# Patient Record
Sex: FEMALE | Race: WHITE | Employment: FULL TIME | URBAN - METROPOLITAN AREA
[De-identification: names, ages, dates, MRNs, and addresses within clinical notes are randomized per-mention and may not be internally consistent; named-entity substitution may affect disease eponyms.]

---

## 2017-11-07 ENCOUNTER — HOSPITAL ENCOUNTER (OUTPATIENT)
Dept: MAMMOGRAPHY | Age: 57
Discharge: HOME | End: 2017-11-07
Attending: INTERNAL MEDICINE | Admitting: INTERNAL MEDICINE

## 2017-11-07 DIAGNOSIS — Z12.31 VISIT FOR SCREENING MAMMOGRAM: ICD-10-CM

## 2018-01-23 ENCOUNTER — OFFICE VISIT (OUTPATIENT)
Dept: INTERNAL MEDICINE CLINIC | Age: 58
End: 2018-01-23

## 2018-01-23 VITALS
SYSTOLIC BLOOD PRESSURE: 124 MMHG | DIASTOLIC BLOOD PRESSURE: 88 MMHG | HEART RATE: 78 BPM | HEIGHT: 65 IN | RESPIRATION RATE: 16 BRPM | WEIGHT: 141 LBS | BODY MASS INDEX: 23.49 KG/M2

## 2018-01-23 DIAGNOSIS — Z00.00 ANNUAL PHYSICAL EXAM: Primary | ICD-10-CM

## 2018-01-23 DIAGNOSIS — F17.210 NICOTINE DEPENDENCE, CIGARETTES, UNCOMPLICATED: ICD-10-CM

## 2018-01-23 DIAGNOSIS — Z23 NEED FOR DIPHTHERIA-TETANUS-PERTUSSIS (TDAP) VACCINE, ADULT/ADOLESCENT: ICD-10-CM

## 2018-01-23 PROCEDURE — 4004F PT TOBACCO SCREEN RCVD TLK: CPT | Performed by: INTERNAL MEDICINE

## 2018-01-23 PROCEDURE — 3017F COLORECTAL CA SCREEN DOC REV: CPT | Performed by: INTERNAL MEDICINE

## 2018-01-23 PROCEDURE — 3014F SCREEN MAMMO DOC REV: CPT | Performed by: INTERNAL MEDICINE

## 2018-01-23 PROCEDURE — G8420 CALC BMI NORM PARAMETERS: HCPCS | Performed by: INTERNAL MEDICINE

## 2018-01-23 PROCEDURE — 90715 TDAP VACCINE 7 YRS/> IM: CPT | Performed by: INTERNAL MEDICINE

## 2018-01-23 PROCEDURE — G8484 FLU IMMUNIZE NO ADMIN: HCPCS | Performed by: INTERNAL MEDICINE

## 2018-01-23 PROCEDURE — G8427 DOCREV CUR MEDS BY ELIG CLIN: HCPCS | Performed by: INTERNAL MEDICINE

## 2018-01-23 PROCEDURE — 90471 IMMUNIZATION ADMIN: CPT | Performed by: INTERNAL MEDICINE

## 2018-01-23 PROCEDURE — 99213 OFFICE O/P EST LOW 20 MIN: CPT | Performed by: INTERNAL MEDICINE

## 2018-01-23 PROCEDURE — 99396 PREV VISIT EST AGE 40-64: CPT | Performed by: INTERNAL MEDICINE

## 2018-01-23 PROCEDURE — G0296 VISIT TO DETERM LDCT ELIG: HCPCS | Performed by: INTERNAL MEDICINE

## 2018-01-23 RX ORDER — BUPROPION HYDROCHLORIDE 150 MG/1
150 TABLET, EXTENDED RELEASE ORAL 2 TIMES DAILY
Qty: 60 TABLET | Refills: 0 | Status: SHIPPED | OUTPATIENT
Start: 2018-01-23 | End: 2018-02-20 | Stop reason: SDUPTHER

## 2018-01-23 NOTE — PROGRESS NOTES
[facility]  History and Physical  Ashley Garcia M.D. Javier Aleman  YOB: 1960    Date of Service:  1/23/2018    Chief Complaint:   Javier Aleman is a 62 y.o. female who presents for   Chief Complaint   Patient presents with    Annual Exam     quit smoking       HPI: Here for Annual Physical and Follow up. She wants to quit smoking with wellbutrin. She's ready now to quit.     Lab Results   Component Value Date    LABMICR Yes 12/29/2014     Lab Results   Component Value Date     05/17/2016    K 4.0 05/17/2016     05/17/2016    CO2 23 05/17/2016    BUN 10 05/17/2016    CREATININE <0.5 (L) 05/17/2016    GLUCOSE 106 (H) 05/17/2016    CALCIUM 9.7 05/17/2016     Lab Results   Component Value Date    CHOL 208 05/17/2016    TRIG 90 05/17/2016    HDL 56 05/17/2016    LDLCALC 134 05/17/2016     Lab Results   Component Value Date    ALT 34 05/17/2016    AST 27 05/17/2016     No results found for: TSH, T4FREE  Lab Results   Component Value Date    WBC 8.9 05/17/2016    HGB 15.2 05/17/2016    HCT 45.7 05/17/2016    MCV 93.8 05/17/2016     05/17/2016     Lab Results   Component Value Date    INR 1.04 05/19/2014      No results found for: PSA   No results found for: LABURIC     Wt Readings from Last 3 Encounters:   01/23/18 141 lb (64 kg)   08/25/16 136 lb (61.7 kg)   07/20/16 137 lb (62.1 kg)     BP Readings from Last 3 Encounters:   01/23/18 124/88   08/25/16 120/80   07/20/16 118/80       Patient Active Problem List   Diagnosis    Anxiety    Primary insomnia       No Known Allergies  No outpatient prescriptions have been marked as taking for the 1/23/18 encounter (Office Visit) with Mile Rodriguez MD.       Past Medical History:   Diagnosis Date    Anxiety     Diverticulitis      Past Surgical History:   Procedure Laterality Date    COLONOSCOPY  4/2/2015    INGUINAL HERNIA REPAIR Right 1996     Family History   Problem Relation Age of Onset    Other plan of care for Batsheva Hernandez        1/23/2018           Preventive Measures Status       Recommendations for screening   Colon Cancer Screen   Colonoscopy Test is due April   Breast Cancer Screen  Mammogram Repeat yearly   Cervical Cancer Screen   PAP smear:  Test is due 2021   Osteoporosis Screen   DEXA scan  This test is not clinically indicated   Diabetes Screen  Glucose (mg/dL)   Date Value   05/17/2016 106 (H)    Test recommended and ordered   Cholesterol Screen  Lab Results   Component Value Date    CHOL 208 (H) 05/17/2016    TRIG 90 05/17/2016    HDL 56 05/17/2016    LDLCALC 134 (H) 05/17/2016    Test recommended and ordered   Aspirin for Cardiovascular Prevention   No Not indicated   Weight: Body mass index is 23.46 kg/m².   5' 5\" (1.651 m)141 lb (64 kg)    Your BMI is between 18.5 and 24.9, which indicates that you are at a healthy weight   Living Will: No   Full Code    Recommended Immunizations    Immunization History   Administered Date(s) Administered    Pneumococcal Polysaccharide ([id number]) 05/02/2016        Influenza vaccine:  recommended every fall  Tetanus vaccine:  tetanus and diptheria vaccine (Td) administered today- risks and benefits discussed         Other Recommendations ·   See a dentist every 6 months  · Try to get at least 30 minutes of exercise 3-5 days per week  · Always wear a seat belt when traveling in a car  · Always wear a helmet when riding a bicycle or motorcycle  · When exposed to the sun, use a sunscreen that protects against both UVA and UVB radiation with an SPF of 30 or greater- reapply every 2 to 3 hours or after sweating, drying off with a towel, or swimming  · You need 6488-8961 mg of calcium and 3519-1332 IU of vitamin D per day- it is possible to meet your calcium requirement with diet alone, but a vitamin D supplement is usually necessary                 Assessment/Plan:    Elsie Gamboa was seen today for annual exam.    Diagnoses and all orders for this abstinence from smoking is critical

## 2018-02-06 ENCOUNTER — HOSPITAL ENCOUNTER (OUTPATIENT)
Dept: OTHER | Age: 58
Discharge: HOME | End: 2018-02-06
Attending: INTERNAL MEDICINE | Admitting: INTERNAL MEDICINE

## 2018-02-06 DIAGNOSIS — Z00.00 ANNUAL PHYSICAL EXAM: ICD-10-CM

## 2018-02-06 LAB
A/G RATIO: 1.6 (ref 1.1–2.2)
ALBUMIN SERPL-MCNC: 4.5 G/DL (ref 3.4–5)
ALP BLD-CCNC: 99 U/L (ref 40–129)
ALT SERPL-CCNC: 36 U/L (ref 10–40)
ANION GAP SERPL CALCULATED.3IONS-SCNC: 14 MMOL/L (ref 3–16)
AST SERPL-CCNC: 23 U/L (ref 15–37)
BASOPHILS ABSOLUTE: 0.1 K/UL (ref 0–0.2)
BASOPHILS RELATIVE PERCENT: 0.8 %
BILIRUB SERPL-MCNC: 0.3 MG/DL (ref 0–1)
BUN BLDV-MCNC: 9 MG/DL (ref 7–20)
CALCIUM SERPL-MCNC: 9.8 MG/DL (ref 8.3–10.6)
CHLORIDE BLD-SCNC: 101 MMOL/L (ref 99–110)
CHOLESTEROL, TOTAL: 293 MG/DL (ref 0–199)
CO2: 24 MMOL/L (ref 21–32)
CREAT SERPL-MCNC: <0.5 MG/DL (ref 0.6–1.1)
EOSINOPHILS ABSOLUTE: 0.3 K/UL (ref 0–0.6)
EOSINOPHILS RELATIVE PERCENT: 3.7 %
GFR AFRICAN AMERICAN: >60
GFR NON-AFRICAN AMERICAN: >60
GLOBULIN: 2.8 G/DL
GLUCOSE BLD-MCNC: 114 MG/DL (ref 70–99)
HCT VFR BLD CALC: 42.7 % (ref 36–48)
HDLC SERPL-MCNC: 64 MG/DL (ref 40–60)
HEMOGLOBIN: 14.3 G/DL (ref 12–16)
LDL CHOLESTEROL CALCULATED: 203 MG/DL
LYMPHOCYTES ABSOLUTE: 4.2 K/UL (ref 1–5.1)
LYMPHOCYTES RELATIVE PERCENT: 47.8 %
MCH RBC QN AUTO: 31.1 PG (ref 26–34)
MCHC RBC AUTO-ENTMCNC: 33.6 G/DL (ref 31–36)
MCV RBC AUTO: 92.6 FL (ref 80–100)
MONOCYTES ABSOLUTE: 0.5 K/UL (ref 0–1.3)
MONOCYTES RELATIVE PERCENT: 5.8 %
NEUTROPHILS ABSOLUTE: 3.6 K/UL (ref 1.7–7.7)
NEUTROPHILS RELATIVE PERCENT: 41.9 %
PDW BLD-RTO: 14.3 % (ref 12.4–15.4)
PLATELET # BLD: 268 K/UL (ref 135–450)
PMV BLD AUTO: 9.4 FL (ref 5–10.5)
POTASSIUM SERPL-SCNC: 4.4 MMOL/L (ref 3.5–5.1)
RBC # BLD: 4.62 M/UL (ref 4–5.2)
SODIUM BLD-SCNC: 139 MMOL/L (ref 136–145)
TOTAL PROTEIN: 7.3 G/DL (ref 6.4–8.2)
TRIGL SERPL-MCNC: 131 MG/DL (ref 0–150)
TSH REFLEX: 1.63 UIU/ML (ref 0.27–4.2)
VLDLC SERPL CALC-MCNC: 26 MG/DL
WBC # BLD: 8.7 K/UL (ref 4–11)

## 2018-02-07 DIAGNOSIS — R73.9 HYPERGLYCEMIA: Primary | ICD-10-CM

## 2018-02-07 LAB
ESTIMATED AVERAGE GLUCOSE: 119.8 MG/DL
HBA1C MFR BLD: 5.8 %

## 2018-02-20 ENCOUNTER — OFFICE VISIT (OUTPATIENT)
Dept: INTERNAL MEDICINE CLINIC | Age: 58
End: 2018-02-20

## 2018-02-20 VITALS
HEIGHT: 65 IN | DIASTOLIC BLOOD PRESSURE: 72 MMHG | RESPIRATION RATE: 16 BRPM | SYSTOLIC BLOOD PRESSURE: 115 MMHG | HEART RATE: 64 BPM | BODY MASS INDEX: 23.32 KG/M2 | WEIGHT: 140 LBS

## 2018-02-20 DIAGNOSIS — E78.2 MIXED HYPERLIPIDEMIA: ICD-10-CM

## 2018-02-20 DIAGNOSIS — F17.210 NICOTINE DEPENDENCE, CIGARETTES, UNCOMPLICATED: ICD-10-CM

## 2018-02-20 PROCEDURE — G8420 CALC BMI NORM PARAMETERS: HCPCS | Performed by: INTERNAL MEDICINE

## 2018-02-20 PROCEDURE — 3017F COLORECTAL CA SCREEN DOC REV: CPT | Performed by: INTERNAL MEDICINE

## 2018-02-20 PROCEDURE — G8484 FLU IMMUNIZE NO ADMIN: HCPCS | Performed by: INTERNAL MEDICINE

## 2018-02-20 PROCEDURE — G8427 DOCREV CUR MEDS BY ELIG CLIN: HCPCS | Performed by: INTERNAL MEDICINE

## 2018-02-20 PROCEDURE — 3014F SCREEN MAMMO DOC REV: CPT | Performed by: INTERNAL MEDICINE

## 2018-02-20 PROCEDURE — 4004F PT TOBACCO SCREEN RCVD TLK: CPT | Performed by: INTERNAL MEDICINE

## 2018-02-20 PROCEDURE — 99213 OFFICE O/P EST LOW 20 MIN: CPT | Performed by: INTERNAL MEDICINE

## 2018-02-20 RX ORDER — BUPROPION HYDROCHLORIDE 150 MG/1
150 TABLET, EXTENDED RELEASE ORAL 2 TIMES DAILY
Qty: 60 TABLET | Refills: 4 | Status: SHIPPED | OUTPATIENT
Start: 2018-02-20 | End: 2018-07-31 | Stop reason: SDUPTHER

## 2018-07-17 ENCOUNTER — HOSPITAL ENCOUNTER (OUTPATIENT)
Age: 58
Discharge: HOME | End: 2018-07-17
Payer: COMMERCIAL

## 2018-07-17 DIAGNOSIS — E78.2 MIXED HYPERLIPIDEMIA: ICD-10-CM

## 2018-07-17 LAB
CHOLESTEROL, TOTAL: 251 MG/DL (ref 0–199)
HDLC SERPL-MCNC: 86 MG/DL (ref 40–60)
LDL CHOLESTEROL CALCULATED: 153 MG/DL
TRIGL SERPL-MCNC: 61 MG/DL (ref 0–150)
VLDLC SERPL CALC-MCNC: 12 MG/DL

## 2018-07-17 PROCEDURE — 80061 LIPID PANEL: CPT

## 2018-07-17 PROCEDURE — 36415 COLL VENOUS BLD VENIPUNCTURE: CPT

## 2018-07-31 ENCOUNTER — OFFICE VISIT (OUTPATIENT)
Dept: INTERNAL MEDICINE CLINIC | Age: 58
End: 2018-07-31

## 2018-07-31 VITALS
OXYGEN SATURATION: 98 % | DIASTOLIC BLOOD PRESSURE: 68 MMHG | HEIGHT: 65 IN | HEART RATE: 77 BPM | WEIGHT: 129.4 LBS | SYSTOLIC BLOOD PRESSURE: 114 MMHG | BODY MASS INDEX: 21.56 KG/M2

## 2018-07-31 DIAGNOSIS — F17.210 NICOTINE DEPENDENCE, CIGARETTES, UNCOMPLICATED: ICD-10-CM

## 2018-07-31 DIAGNOSIS — E78.2 MIXED HYPERLIPIDEMIA: Primary | ICD-10-CM

## 2018-07-31 DIAGNOSIS — Z12.11 SCREEN FOR COLON CANCER: ICD-10-CM

## 2018-07-31 PROCEDURE — 4004F PT TOBACCO SCREEN RCVD TLK: CPT | Performed by: INTERNAL MEDICINE

## 2018-07-31 PROCEDURE — G8420 CALC BMI NORM PARAMETERS: HCPCS | Performed by: INTERNAL MEDICINE

## 2018-07-31 PROCEDURE — G8427 DOCREV CUR MEDS BY ELIG CLIN: HCPCS | Performed by: INTERNAL MEDICINE

## 2018-07-31 PROCEDURE — 3017F COLORECTAL CA SCREEN DOC REV: CPT | Performed by: INTERNAL MEDICINE

## 2018-07-31 PROCEDURE — 99213 OFFICE O/P EST LOW 20 MIN: CPT | Performed by: INTERNAL MEDICINE

## 2018-07-31 RX ORDER — ATORVASTATIN CALCIUM 10 MG/1
10 TABLET, FILM COATED ORAL DAILY
Qty: 90 TABLET | Refills: 0 | Status: SHIPPED | OUTPATIENT
Start: 2018-07-31 | End: 2018-11-14 | Stop reason: SDUPTHER

## 2018-07-31 RX ORDER — BUPROPION HYDROCHLORIDE 150 MG/1
150 TABLET, EXTENDED RELEASE ORAL 2 TIMES DAILY
Qty: 60 TABLET | Refills: 5 | Status: SHIPPED | OUTPATIENT
Start: 2018-07-31 | End: 2019-03-27 | Stop reason: SDUPTHER

## 2018-07-31 ASSESSMENT — PATIENT HEALTH QUESTIONNAIRE - PHQ9
2. FEELING DOWN, DEPRESSED OR HOPELESS: 0
1. LITTLE INTEREST OR PLEASURE IN DOING THINGS: 0
SUM OF ALL RESPONSES TO PHQ9 QUESTIONS 1 & 2: 0
SUM OF ALL RESPONSES TO PHQ QUESTIONS 1-9: 0

## 2018-07-31 NOTE — PROGRESS NOTES
Edith Aguilar  YOB: 1960    Date of Service:  7/31/2018    Chief Complaint:      Chief Complaint   Patient presents with    Other     5 month f/u Wellbutrin       HPI:  Edith Aguilar is a 62 y.o. Hyperlipidemia:  Patient is  following a low fat, low cholesterol diet. She is  exercising regularly. OTC Supplements: none. Tob Dependence:  Improved and she rarely smoke now and usually just take one puff and put it out. 1 pack for the past 2 weeks.     Lab Results   Component Value Date    LABA1C 5.8 02/06/2018    LABMICR Yes 12/29/2014     Lab Results   Component Value Date     02/06/2018    K 4.4 02/06/2018     02/06/2018    CO2 24 02/06/2018    BUN 9 02/06/2018    CREATININE <0.5 (L) 02/06/2018    GLUCOSE 114 (H) 02/06/2018    CALCIUM 9.8 02/06/2018     Lab Results   Component Value Date    CHOL 251 07/17/2018    TRIG 61 07/17/2018    HDL 86 07/17/2018    LDLCALC 153 07/17/2018     Lab Results   Component Value Date    ALT 36 02/06/2018    AST 23 02/06/2018     No results found for: TSH, T4FREE  Lab Results   Component Value Date    WBC 8.7 02/06/2018    HGB 14.3 02/06/2018    HCT 42.7 02/06/2018    MCV 92.6 02/06/2018     02/06/2018     Lab Results   Component Value Date    INR 1.04 05/19/2014      No results found for: PSA   No results found for: LABURIC     Patient Active Problem List   Diagnosis    Anxiety    Primary insomnia    Mixed hyperlipidemia       No Known Allergies  Outpatient Prescriptions Marked as Taking for the 7/31/18 encounter (Office Visit) with Marian Cabrera MD   Medication Sig Dispense Refill    vitamin D (CHOLECALCIFEROL) 1000 UNIT TABS tablet Take 1,000 Units by mouth daily      buPROPion (WELLBUTRIN SR) 150 MG extended release tablet Take 1 tablet by mouth 2 times daily 60 tablet 4    FIBER PO Take 1 tablet by mouth daily      Calcium Carbonate-Vit D-Min (CALCIUM 1200) 8574-6666 MG-UNIT CHEW Take by mouth      Probiotic Product

## 2018-11-14 DIAGNOSIS — E78.2 MIXED HYPERLIPIDEMIA: ICD-10-CM

## 2018-11-14 RX ORDER — ATORVASTATIN CALCIUM 10 MG/1
10 TABLET, FILM COATED ORAL DAILY
Qty: 90 TABLET | Refills: 0 | Status: SHIPPED | OUTPATIENT
Start: 2018-11-14 | End: 2019-03-07 | Stop reason: SDUPTHER

## 2018-12-04 ENCOUNTER — HOSPITAL ENCOUNTER (OUTPATIENT)
Dept: WOMENS IMAGING | Age: 58
Discharge: HOME | End: 2018-12-04
Payer: COMMERCIAL

## 2018-12-04 DIAGNOSIS — Z12.39 BREAST CANCER SCREENING: ICD-10-CM

## 2018-12-04 PROCEDURE — 77067 SCR MAMMO BI INCL CAD: CPT

## 2019-03-27 ENCOUNTER — OFFICE VISIT (OUTPATIENT)
Dept: INTERNAL MEDICINE CLINIC | Age: 59
End: 2019-03-27
Payer: COMMERCIAL

## 2019-03-27 VITALS
OXYGEN SATURATION: 99 % | HEART RATE: 70 BPM | SYSTOLIC BLOOD PRESSURE: 108 MMHG | BODY MASS INDEX: 21.83 KG/M2 | HEIGHT: 65 IN | DIASTOLIC BLOOD PRESSURE: 72 MMHG | WEIGHT: 131 LBS

## 2019-03-27 DIAGNOSIS — Z87.891 PERSONAL HISTORY OF TOBACCO USE: ICD-10-CM

## 2019-03-27 DIAGNOSIS — F41.9 ANXIETY: ICD-10-CM

## 2019-03-27 DIAGNOSIS — F17.210 NICOTINE DEPENDENCE, CIGARETTES, UNCOMPLICATED: ICD-10-CM

## 2019-03-27 DIAGNOSIS — Z00.00 ANNUAL PHYSICAL EXAM: Primary | ICD-10-CM

## 2019-03-27 DIAGNOSIS — E78.2 MIXED HYPERLIPIDEMIA: ICD-10-CM

## 2019-03-27 DIAGNOSIS — Z12.11 SCREEN FOR COLON CANCER: ICD-10-CM

## 2019-03-27 PROCEDURE — 99396 PREV VISIT EST AGE 40-64: CPT | Performed by: INTERNAL MEDICINE

## 2019-03-27 PROCEDURE — G8484 FLU IMMUNIZE NO ADMIN: HCPCS | Performed by: INTERNAL MEDICINE

## 2019-03-27 PROCEDURE — G0296 VISIT TO DETERM LDCT ELIG: HCPCS | Performed by: INTERNAL MEDICINE

## 2019-03-27 RX ORDER — BUPROPION HYDROCHLORIDE 150 MG/1
150 TABLET, EXTENDED RELEASE ORAL 2 TIMES DAILY
Qty: 60 TABLET | Refills: 5 | Status: SHIPPED | OUTPATIENT
Start: 2019-03-27 | End: 2019-10-02 | Stop reason: SDUPTHER

## 2019-03-27 ASSESSMENT — PATIENT HEALTH QUESTIONNAIRE - PHQ9
1. LITTLE INTEREST OR PLEASURE IN DOING THINGS: 0
SUM OF ALL RESPONSES TO PHQ9 QUESTIONS 1 & 2: 0
SUM OF ALL RESPONSES TO PHQ QUESTIONS 1-9: 0
2. FEELING DOWN, DEPRESSED OR HOPELESS: 0
SUM OF ALL RESPONSES TO PHQ QUESTIONS 1-9: 0

## 2019-06-28 ENCOUNTER — HOSPITAL ENCOUNTER (OUTPATIENT)
Dept: CT IMAGING | Age: 59
Discharge: HOME | End: 2019-06-28
Payer: COMMERCIAL

## 2019-06-28 DIAGNOSIS — Z87.891 PERSONAL HISTORY OF TOBACCO USE: ICD-10-CM

## 2019-06-28 PROCEDURE — G0297 LDCT FOR LUNG CA SCREEN: HCPCS

## 2019-09-25 ENCOUNTER — HOSPITAL ENCOUNTER (OUTPATIENT)
Age: 59
Discharge: HOME | End: 2019-09-25
Payer: COMMERCIAL

## 2019-09-25 DIAGNOSIS — Z00.00 ANNUAL PHYSICAL EXAM: ICD-10-CM

## 2019-09-25 LAB
A/G RATIO: 1.4 (ref 1.1–2.2)
ALBUMIN SERPL-MCNC: 4.6 G/DL (ref 3.4–5)
ALP BLD-CCNC: 82 U/L (ref 40–129)
ALT SERPL-CCNC: 40 U/L (ref 10–40)
ANION GAP SERPL CALCULATED.3IONS-SCNC: 17 MMOL/L (ref 3–16)
AST SERPL-CCNC: 27 U/L (ref 15–37)
BASOPHILS ABSOLUTE: 0.1 K/UL (ref 0–0.2)
BASOPHILS RELATIVE PERCENT: 1.1 %
BILIRUB SERPL-MCNC: <0.2 MG/DL (ref 0–1)
BUN BLDV-MCNC: 12 MG/DL (ref 7–20)
CALCIUM SERPL-MCNC: 9.9 MG/DL (ref 8.3–10.6)
CHLORIDE BLD-SCNC: 102 MMOL/L (ref 99–110)
CHOLESTEROL, TOTAL: 276 MG/DL (ref 0–199)
CO2: 21 MMOL/L (ref 21–32)
CREAT SERPL-MCNC: <0.5 MG/DL (ref 0.6–1.1)
EOSINOPHILS ABSOLUTE: 0.2 K/UL (ref 0–0.6)
EOSINOPHILS RELATIVE PERCENT: 3.1 %
GFR AFRICAN AMERICAN: >60
GFR NON-AFRICAN AMERICAN: >60
GLOBULIN: 3.2 G/DL
GLUCOSE BLD-MCNC: 95 MG/DL (ref 70–99)
HCT VFR BLD CALC: 43.3 % (ref 36–48)
HDLC SERPL-MCNC: 113 MG/DL (ref 40–60)
HEMOGLOBIN: 14.4 G/DL (ref 12–16)
LDL CHOLESTEROL CALCULATED: 149 MG/DL
LYMPHOCYTES ABSOLUTE: 2.8 K/UL (ref 1–5.1)
LYMPHOCYTES RELATIVE PERCENT: 43.5 %
MCH RBC QN AUTO: 30.6 PG (ref 26–34)
MCHC RBC AUTO-ENTMCNC: 33.2 G/DL (ref 31–36)
MCV RBC AUTO: 91.9 FL (ref 80–100)
MONOCYTES ABSOLUTE: 0.5 K/UL (ref 0–1.3)
MONOCYTES RELATIVE PERCENT: 7.3 %
NEUTROPHILS ABSOLUTE: 2.9 K/UL (ref 1.7–7.7)
NEUTROPHILS RELATIVE PERCENT: 45 %
PDW BLD-RTO: 13.9 % (ref 12.4–15.4)
PLATELET # BLD: 293 K/UL (ref 135–450)
PMV BLD AUTO: 8.7 FL (ref 5–10.5)
POTASSIUM SERPL-SCNC: 5.1 MMOL/L (ref 3.5–5.1)
RBC # BLD: 4.71 M/UL (ref 4–5.2)
SODIUM BLD-SCNC: 140 MMOL/L (ref 136–145)
TOTAL PROTEIN: 7.8 G/DL (ref 6.4–8.2)
TRIGL SERPL-MCNC: 68 MG/DL (ref 0–150)
VLDLC SERPL CALC-MCNC: 14 MG/DL
WBC # BLD: 6.4 K/UL (ref 4–11)

## 2019-09-25 PROCEDURE — 80053 COMPREHEN METABOLIC PANEL: CPT

## 2019-09-25 PROCEDURE — 85025 COMPLETE CBC W/AUTO DIFF WBC: CPT

## 2019-09-25 PROCEDURE — 80061 LIPID PANEL: CPT

## 2019-09-25 PROCEDURE — 36415 COLL VENOUS BLD VENIPUNCTURE: CPT

## 2019-10-02 ENCOUNTER — OFFICE VISIT (OUTPATIENT)
Dept: INTERNAL MEDICINE CLINIC | Age: 59
End: 2019-10-02
Payer: COMMERCIAL

## 2019-10-02 VITALS
DIASTOLIC BLOOD PRESSURE: 72 MMHG | OXYGEN SATURATION: 98 % | SYSTOLIC BLOOD PRESSURE: 134 MMHG | HEART RATE: 68 BPM | BODY MASS INDEX: 22.16 KG/M2 | WEIGHT: 133 LBS | HEIGHT: 65 IN

## 2019-10-02 DIAGNOSIS — Z23 NEED FOR INFLUENZA VACCINATION: ICD-10-CM

## 2019-10-02 DIAGNOSIS — F17.210 NICOTINE DEPENDENCE, CIGARETTES, UNCOMPLICATED: ICD-10-CM

## 2019-10-02 DIAGNOSIS — E78.2 MIXED HYPERLIPIDEMIA: Primary | ICD-10-CM

## 2019-10-02 DIAGNOSIS — R91.8 MULTIPLE SUBSOLID LUNG NODULES LESS THAN 6 MM IN DIAMETER: ICD-10-CM

## 2019-10-02 DIAGNOSIS — F41.9 ANXIETY: ICD-10-CM

## 2019-10-02 PROCEDURE — G8420 CALC BMI NORM PARAMETERS: HCPCS | Performed by: INTERNAL MEDICINE

## 2019-10-02 PROCEDURE — 3017F COLORECTAL CA SCREEN DOC REV: CPT | Performed by: INTERNAL MEDICINE

## 2019-10-02 PROCEDURE — 90688 IIV4 VACCINE SPLT 0.5 ML IM: CPT | Performed by: INTERNAL MEDICINE

## 2019-10-02 PROCEDURE — G8482 FLU IMMUNIZE ORDER/ADMIN: HCPCS | Performed by: INTERNAL MEDICINE

## 2019-10-02 PROCEDURE — 90471 IMMUNIZATION ADMIN: CPT | Performed by: INTERNAL MEDICINE

## 2019-10-02 PROCEDURE — G8427 DOCREV CUR MEDS BY ELIG CLIN: HCPCS | Performed by: INTERNAL MEDICINE

## 2019-10-02 PROCEDURE — 99214 OFFICE O/P EST MOD 30 MIN: CPT | Performed by: INTERNAL MEDICINE

## 2019-10-02 PROCEDURE — 1036F TOBACCO NON-USER: CPT | Performed by: INTERNAL MEDICINE

## 2019-10-02 RX ORDER — BUPROPION HYDROCHLORIDE 150 MG/1
150 TABLET, EXTENDED RELEASE ORAL 2 TIMES DAILY
Qty: 60 TABLET | Refills: 5 | Status: SHIPPED | OUTPATIENT
Start: 2019-10-02 | End: 2020-07-23

## 2019-10-02 RX ORDER — ATORVASTATIN CALCIUM 10 MG/1
10 TABLET, FILM COATED ORAL DAILY
Qty: 90 TABLET | Refills: 1 | Status: SHIPPED | OUTPATIENT
Start: 2019-10-02 | End: 2020-07-23 | Stop reason: SDUPTHER

## 2019-10-27 ENCOUNTER — HOSPITAL ENCOUNTER (EMERGENCY)
Age: 59
Discharge: HOME | End: 2019-10-27
Payer: COMMERCIAL

## 2019-10-27 VITALS
WEIGHT: 130 LBS | HEIGHT: 65 IN | SYSTOLIC BLOOD PRESSURE: 155 MMHG | RESPIRATION RATE: 16 BRPM | BODY MASS INDEX: 21.66 KG/M2 | OXYGEN SATURATION: 97 % | TEMPERATURE: 97.5 F | DIASTOLIC BLOOD PRESSURE: 90 MMHG | HEART RATE: 77 BPM

## 2019-10-27 DIAGNOSIS — M25.562 ACUTE PAIN OF LEFT KNEE: Primary | ICD-10-CM

## 2019-10-27 PROCEDURE — 99283 EMERGENCY DEPT VISIT LOW MDM: CPT

## 2019-10-27 ASSESSMENT — PAIN SCALES - GENERAL: PAINLEVEL_OUTOF10: 5

## 2020-01-15 ENCOUNTER — HOSPITAL ENCOUNTER (OUTPATIENT)
Dept: WOMENS IMAGING | Age: 60
Discharge: HOME | End: 2020-01-15
Payer: COMMERCIAL

## 2020-01-15 ENCOUNTER — HOSPITAL ENCOUNTER (OUTPATIENT)
Dept: CT IMAGING | Age: 60
Discharge: HOME | End: 2020-01-15
Payer: COMMERCIAL

## 2020-01-15 PROCEDURE — 77067 SCR MAMMO BI INCL CAD: CPT

## 2020-01-15 PROCEDURE — 71250 CT THORAX DX C-: CPT

## 2020-07-23 ENCOUNTER — VIRTUAL VISIT (OUTPATIENT)
Dept: INTERNAL MEDICINE CLINIC | Age: 60
End: 2020-07-23
Payer: COMMERCIAL

## 2020-07-23 PROCEDURE — 3017F COLORECTAL CA SCREEN DOC REV: CPT | Performed by: INTERNAL MEDICINE

## 2020-07-23 PROCEDURE — G8427 DOCREV CUR MEDS BY ELIG CLIN: HCPCS | Performed by: INTERNAL MEDICINE

## 2020-07-23 PROCEDURE — 99213 OFFICE O/P EST LOW 20 MIN: CPT | Performed by: INTERNAL MEDICINE

## 2020-07-23 RX ORDER — ATORVASTATIN CALCIUM 10 MG/1
10 TABLET, FILM COATED ORAL DAILY
Qty: 30 TABLET | Refills: 1 | Status: SHIPPED
Start: 2020-07-23 | End: 2020-09-17 | Stop reason: DRUGHIGH

## 2020-07-23 RX ORDER — BUPROPION HYDROCHLORIDE 300 MG/1
300 TABLET ORAL EVERY MORNING
Qty: 30 TABLET | Refills: 1 | Status: SHIPPED | OUTPATIENT
Start: 2020-07-23 | End: 2020-09-16 | Stop reason: SDUPTHER

## 2020-07-23 ASSESSMENT — PATIENT HEALTH QUESTIONNAIRE - PHQ9
SUM OF ALL RESPONSES TO PHQ QUESTIONS 1-9: 0
SUM OF ALL RESPONSES TO PHQ9 QUESTIONS 1 & 2: 0
1. LITTLE INTEREST OR PLEASURE IN DOING THINGS: 0
2. FEELING DOWN, DEPRESSED OR HOPELESS: 0
SUM OF ALL RESPONSES TO PHQ QUESTIONS 1-9: 0

## 2020-07-23 NOTE — PROGRESS NOTES
Date of Service:  7/23/2020    Chief Complaint:      Chief Complaint   Patient presents with    Anxiety    Hyperlipidemia       HPI:  Anaya Montes is a 61 y.o. Pursuant to the emergency declaration under the [facility], [facility] waiver authority and the [employer] Act, this Virtual  Video Visit was conducted, with patient's consent, to reduce the patient's risk of exposure to COVID-19 and provide continuity of care. Service is  provided through a video synchronous discussion virtually to substitute for in-person clinic visit with the patient being at home and Dr. Zurdo Guerrero being at home. Hyperlipidemia:  No new myalgias or GI upset on atorvastatin (Lipitor). Medication compliance: compliant most of the time. Patient is  following a low fat, low cholesterol diet. She is  exercising regularly. Anxiety:  Improved on Wellbutrin  mg bid and has not smoke since July 2019.     Lab Results   Component Value Date    LABA1C 5.8 02/06/2018    LABMICR Yes 12/29/2014     Lab Results   Component Value Date     09/25/2019    K 5.1 09/25/2019     09/25/2019    CO2 21 09/25/2019    BUN 12 09/25/2019    CREATININE <0.5 (L) 09/25/2019    GLUCOSE 95 09/25/2019    CALCIUM 9.9 09/25/2019     Lab Results   Component Value Date    CHOL 276 09/25/2019    TRIG 68 09/25/2019     09/25/2019    LDLCALC 149 09/25/2019     Lab Results   Component Value Date    ALT 40 09/25/2019    AST 27 09/25/2019     No results found for: TSH, T4FREE  Lab Results   Component Value Date    WBC 6.4 09/25/2019    HGB 14.4 09/25/2019    HCT 43.3 09/25/2019    MCV 91.9 09/25/2019     09/25/2019     Lab Results   Component Value Date    INR 1.04 05/19/2014      No results found for: PSA   No results found for: [facility]     Patient Active Problem List   Diagnosis    Anxiety    Primary insomnia    Mixed hyperlipidemia    Multiple

## 2020-09-16 ENCOUNTER — OFFICE VISIT (OUTPATIENT)
Dept: INTERNAL MEDICINE CLINIC | Age: 60
End: 2020-09-16
Payer: COMMERCIAL

## 2020-09-16 VITALS
TEMPERATURE: 98.2 F | SYSTOLIC BLOOD PRESSURE: 136 MMHG | OXYGEN SATURATION: 100 % | HEART RATE: 68 BPM | HEIGHT: 65 IN | WEIGHT: 147 LBS | BODY MASS INDEX: 24.49 KG/M2 | DIASTOLIC BLOOD PRESSURE: 84 MMHG

## 2020-09-16 LAB
A/G RATIO: 2.1 (ref 1.1–2.2)
ALBUMIN SERPL-MCNC: 4.7 G/DL (ref 3.4–5)
ALP BLD-CCNC: 87 U/L (ref 40–129)
ALT SERPL-CCNC: 45 U/L (ref 10–40)
ANION GAP SERPL CALCULATED.3IONS-SCNC: 9 MMOL/L (ref 3–16)
AST SERPL-CCNC: 27 U/L (ref 15–37)
BASOPHILS ABSOLUTE: 0.1 K/UL (ref 0–0.2)
BASOPHILS RELATIVE PERCENT: 1.4 %
BILIRUB SERPL-MCNC: <0.2 MG/DL (ref 0–1)
BUN BLDV-MCNC: 13 MG/DL (ref 7–20)
CALCIUM SERPL-MCNC: 10 MG/DL (ref 8.3–10.6)
CHLORIDE BLD-SCNC: 104 MMOL/L (ref 99–110)
CHOLESTEROL, TOTAL: 217 MG/DL (ref 0–199)
CO2: 25 MMOL/L (ref 21–32)
CREAT SERPL-MCNC: <0.5 MG/DL (ref 0.6–1.1)
EOSINOPHILS ABSOLUTE: 0.3 K/UL (ref 0–0.6)
EOSINOPHILS RELATIVE PERCENT: 4.3 %
GFR AFRICAN AMERICAN: >60
GFR NON-AFRICAN AMERICAN: >60
GLOBULIN: 2.2 G/DL
GLUCOSE BLD-MCNC: 118 MG/DL (ref 70–99)
HCT VFR BLD CALC: 41 % (ref 36–48)
HDLC SERPL-MCNC: 83 MG/DL (ref 40–60)
HEMOGLOBIN: 13.4 G/DL (ref 12–16)
LDL CHOLESTEROL CALCULATED: 117 MG/DL
LYMPHOCYTES ABSOLUTE: 2.5 K/UL (ref 1–5.1)
LYMPHOCYTES RELATIVE PERCENT: 42 %
MCH RBC QN AUTO: 30.1 PG (ref 26–34)
MCHC RBC AUTO-ENTMCNC: 32.8 G/DL (ref 31–36)
MCV RBC AUTO: 91.7 FL (ref 80–100)
MONOCYTES ABSOLUTE: 0.4 K/UL (ref 0–1.3)
MONOCYTES RELATIVE PERCENT: 5.8 %
NEUTROPHILS ABSOLUTE: 2.8 K/UL (ref 1.7–7.7)
NEUTROPHILS RELATIVE PERCENT: 46.5 %
PDW BLD-RTO: 14 % (ref 12.4–15.4)
PLATELET # BLD: 288 K/UL (ref 135–450)
PMV BLD AUTO: 9.7 FL (ref 5–10.5)
POTASSIUM SERPL-SCNC: 5.3 MMOL/L (ref 3.5–5.1)
RBC # BLD: 4.47 M/UL (ref 4–5.2)
SODIUM BLD-SCNC: 138 MMOL/L (ref 136–145)
TOTAL PROTEIN: 6.9 G/DL (ref 6.4–8.2)
TRIGL SERPL-MCNC: 85 MG/DL (ref 0–150)
VLDLC SERPL CALC-MCNC: 17 MG/DL
WBC # BLD: 6.1 K/UL (ref 4–11)

## 2020-09-16 PROCEDURE — 90688 IIV4 VACCINE SPLT 0.5 ML IM: CPT | Performed by: INTERNAL MEDICINE

## 2020-09-16 PROCEDURE — 90471 IMMUNIZATION ADMIN: CPT | Performed by: INTERNAL MEDICINE

## 2020-09-16 PROCEDURE — 99396 PREV VISIT EST AGE 40-64: CPT | Performed by: INTERNAL MEDICINE

## 2020-09-16 PROCEDURE — 36415 COLL VENOUS BLD VENIPUNCTURE: CPT | Performed by: INTERNAL MEDICINE

## 2020-09-16 RX ORDER — BUPROPION HYDROCHLORIDE 300 MG/1
300 TABLET ORAL EVERY MORNING
Qty: 30 TABLET | Refills: 11 | Status: SHIPPED | OUTPATIENT
Start: 2020-09-16 | End: 2021-09-15 | Stop reason: SDUPTHER

## 2020-09-16 NOTE — PROGRESS NOTES
[facility]  History and Physical  Ronny Azul M.D. Kaylie Frazier  YOB: 1960    Date of Service:  9/16/2020    Chief Complaint:   Kaylie Frazier is a 61 y.o. female who presents for   Chief Complaint   Patient presents with    Annual Exam       HPI: Here for Annual Physical and Follow up. Hyperlipidemia:  No new myalgias or GI upset on atorvastatin (Lipitor) 10 mg qd for 2 months. Medication compliance: compliant most of the time. Patient is  following a low fat, low cholesterol diet. She is  exercising regularly. Anxiety:  Improved on Wellbutrin  mg qd and has not smoke since July 2019.     Lab Results   Component Value Date    LABA1C 5.8 02/06/2018    LABMICR Yes 12/29/2014     Lab Results   Component Value Date     09/25/2019    K 5.1 09/25/2019     09/25/2019    CO2 21 09/25/2019    BUN 12 09/25/2019    CREATININE <0.5 (L) 09/25/2019    GLUCOSE 95 09/25/2019    CALCIUM 9.9 09/25/2019     Lab Results   Component Value Date    CHOL 276 09/25/2019    TRIG 68 09/25/2019     09/25/2019    LDLCALC 149 09/25/2019     Lab Results   Component Value Date    ALT 40 09/25/2019    AST 27 09/25/2019     No results found for: TSH, T4FREE  Lab Results   Component Value Date    WBC 6.4 09/25/2019    HGB 14.4 09/25/2019    HCT 43.3 09/25/2019    MCV 91.9 09/25/2019     09/25/2019     Lab Results   Component Value Date    INR 1.04 05/19/2014      No results found for: PSA   No results found for: LABURIC     Wt Readings from Last 3 Encounters:   09/16/20 147 lb (66.7 kg)   10/27/19 130 lb (59 kg)   10/02/19 133 lb (60.3 kg)     BP Readings from Last 3 Encounters:   09/16/20 136/84   10/27/19 (!) 155/90   10/02/19 134/72       Patient Active Problem List   Diagnosis    Anxiety    Primary insomnia    Mixed hyperlipidemia    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       No Known Allergies  Outpatient Medications Marked as Taking for the 20 encounter (Office Visit) with Ingris Romano MD   Medication Sig Dispense Refill    atorvastatin (LIPITOR) 10 MG tablet Take 1 tablet by mouth daily 30 tablet 1    buPROPion (WELLBUTRIN XL) 300 MG extended release tablet Take 1 tablet by mouth every morning 30 tablet 1    vitamin D (CHOLECALCIFEROL) 1000 UNIT TABS tablet Take 1,000 Units by mouth daily      FIBER PO Take 1 tablet by mouth daily      Calcium Carbonate-Vit D-Min (CALCIUM 1200) 3776-3310 MG-UNIT CHEW Take by mouth      Probiotic Product (PROBIOTIC DAILY PO) Take  by mouth. Past Medical History:   Diagnosis Date    Anxiety     Diverticulitis     Mixed hyperlipidemia 2018     Past Surgical History:   Procedure Laterality Date    COLONOSCOPY  2015    INGUINAL HERNIA REPAIR Right 1996     Family History   Problem Relation Age of Onset    Other Mother         osteoporosis    Cancer Mother         Lung and Liver    Allergies Sister     Cancer Maternal Grandmother         breast cancer      Social History     Socioeconomic History    Marital status:      Spouse name: Not on file    Number of children: Not on file    Years of education: Not on file    Highest education level: Not on file   Occupational History    Not on file   Social Needs    Financial resource strain: Not on file    Food insecurity     Worry: Not on file     Inability: Not on file    Transportation needs     Medical: Not on file     Non-medical: Not on file   Tobacco Use    Smoking status: Former Smoker     Packs/day: 1.00     Years: 40.00     Pack years: 40.00     Types: Cigarettes     Last attempt to quit: 2019     Years since quittin.2    Smokeless tobacco: Never Used   Substance and Sexual Activity    Alcohol use:  Yes     Alcohol/week: 6.0 standard drinks     Types: 6 Cans of beer per week     Comment: socially    Drug use: No    Sexual activity: Yes     Partners: Male   Lifestyle    Physical activity     Days per week: Not on file     Minutes per session: Not on file    Stress: Not on file   Relationships    Social connections     Talks on phone: Not on file     Gets together: Not on file     Attends Sikhism service: Not on file     Active member of club or organization: Not on file     Attends meetings of clubs or organizations: Not on file     Relationship status: Not on file    Intimate partner violence     Fear of current or ex partner: Not on file     Emotionally abused: Not on file     Physically abused: Not on file     Forced sexual activity: Not on file   Other Topics Concern    Not on file   Social History Narrative    Not on file       Review of Systems:  A comprehensive review of systems was negative except for what was noted in the HPI. Physical Exam:   Vitals:    09/16/20 1055   BP: 136/84   Pulse: 68   Temp: 98.2 °F (36.8 °C)   TempSrc: Infrared   SpO2: 100%   Weight: 147 lb (66.7 kg)   Height: 5' 5\" (1.651 m)     Body mass index is 24.46 kg/m². Constitutional: She is oriented to person, place, and time. She appears well-developed and well-nourished. No distress. HEENT:   Head: Normocephalic and atraumatic. Right Ear: Tympanic membrane, external ear and ear canal normal.   Left Ear: Tympanic membrane, external ear and ear canal normal.   Mouth/Throat: Oropharynx is clear and moist, and mucous membranes are normal.  There is no cervical adenopathy. Eyes: Conjunctivae and extraocular motions are normal. Pupils are equal, round, and reactive to light. Neck: Supple. No JVD present. Carotid bruit is not present. No mass and no thyromegaly present. Cardiovascular: Normal rate, regular rhythm, normal heart sounds and intact distal pulses. Exam reveals no gallop and no friction rub. No murmur heard. Pulmonary/Chest: Effort normal and breath sounds normal. No respiratory distress. She has no wheezes, rhonchi or rales. Abdominal: Soft, non-tender.  Bowel sounds and aorta are normal. She exhibits no organomegaly, mass or bruit. Musculoskeletal: Normal range of motion, no synovitis. She exhibits no edema. Neurological: She is alert and oriented to person, place, and time. She has normal reflexes. No cranial nerve deficit. Coordination normal.   Skin: Skin is warm and dry. There is no rash or erythema. No suspicious lesions noted. Psychiatric: She has a normal mood and affect. Her speech is normal and behavior is normal. Judgment, cognition and memory are normal.       Preventive Care:  Health Maintenance   Topic Date Due    Shingles Vaccine (1 of 2) 10/09/2010    Colon cancer screen colonoscopy  04/02/2018    A1C test (Diabetic or Prediabetic)  02/06/2019    Flu vaccine (1) 09/01/2020    Lipid screen  09/25/2020    Low dose CT lung screening  01/15/2021    Cervical cancer screen  05/24/2021    Breast cancer screen  01/15/2022    DTaP/Tdap/Td vaccine (2 - Td) 01/23/2028    Hepatitis C screen  Addressed    HIV screen  Addressed    Hepatitis A vaccine  Aged Out    Hepatitis B vaccine  Aged Out    Hib vaccine  Aged Out    Meningococcal (ACWY) vaccine  Aged Out    Pneumococcal 0-64 years Vaccine  Aged Out      Hx abnormal PAP: no  Sexual activity: has sex with males   Last eye exam: 2018, normal  Exercise: walks 3 time(s) per week       Preventive plan of care for Alexandro Giles        9/16/2020           Preventive Measures Status       Recommendations for screening                   Diabetes Screen  Glucose (mg/dL)   Date Value   09/25/2019 95    Test recommended and ordered   Cholesterol Screen  Lab Results   Component Value Date    CHOL 276 (H) 09/25/2019    TRIG 68 09/25/2019     (H) 09/25/2019    LDLCALC 149 (H) 09/25/2019    Test recommended and ordered       Weight: Body mass index is 24.46 kg/m².   5' 5\" (1.651 m)147 lb (66.7 kg)    Your BMI is between 18.5 and 24.9, which indicates that you are at a healthy weight        Recommended Immunizations    Immunization History Administered Date(s) Administered    Influenza, Quadv, IM, (6 mo and older Fluzone, Flulaval, Fluarix and 3 yrs and older Afluria) 10/02/2019    Pneumococcal Polysaccharide ([id number]) 05/02/2016    Tdap (Boostrix, Adacel) 01/23/2018        Influenza vaccine:  recommended every fall, administered today, risks and benefits discussed  Shingles vaccine:  Call insurance         Other Recommendations ·   See a dentist every 6 months  · Try to get at least 30 minutes of exercise 3-5 days per week  · Always wear a seat belt when traveling in a car  · Always wear a helmet when riding a bicycle or motorcycle  · When exposed to the sun, use a sunscreen that protects against both UVA and UVB radiation with an SPF of 30 or greater- reapply every 2 to 3 hours or after sweating, drying off with a towel, or swimming  · You need 6382-7595 mg of calcium and 6822-6820 IU of vitamin D per day- it is possible to meet your calcium requirement with diet alone, but a vitamin D supplement is usually necessary                 Assessment/Plan:    Radha Ugalde was seen today for annual exam.    Diagnoses and all orders for this visit:    Annual physical exam  -     Lipid Panel  -     Comprehensive Metabolic Panel  -     CBC Auto Differential    Need for influenza vaccination  -     INFLUENZA, QUADV, 3 YRS AND OLDER, IM, MDV, 0.5ML (Crescencio Syed)    Screen for colon cancer  -     Cologuard (For External Results Only); Future    Anxiety  -     buPROPion (WELLBUTRIN XL) 300 MG extended release tablet; Take 1 tablet by mouth every morning    Mixed hyperlipidemia    Will refill once get results back to see if dose needs to be increased    Return 6 months on cholesterol and mood.

## 2020-09-17 DIAGNOSIS — Z00.00 ANNUAL PHYSICAL EXAM: ICD-10-CM

## 2020-09-17 LAB
ESTIMATED AVERAGE GLUCOSE: 128.4 MG/DL
HBA1C MFR BLD: 6.1 %

## 2020-09-17 RX ORDER — ATORVASTATIN CALCIUM 20 MG/1
20 TABLET, FILM COATED ORAL DAILY
Qty: 30 TABLET | Refills: 5 | Status: SHIPPED | OUTPATIENT
Start: 2020-09-17 | End: 2021-04-27 | Stop reason: SDUPTHER

## 2021-02-03 ENCOUNTER — HOSPITAL ENCOUNTER (OUTPATIENT)
Dept: WOMENS IMAGING | Age: 61
Discharge: HOME | End: 2021-02-03
Payer: COMMERCIAL

## 2021-02-03 DIAGNOSIS — Z12.31 VISIT FOR SCREENING MAMMOGRAM: ICD-10-CM

## 2021-02-03 PROCEDURE — 77067 SCR MAMMO BI INCL CAD: CPT

## 2021-02-11 DIAGNOSIS — Z87.891 HISTORY OF TOBACCO USE: Primary | ICD-10-CM

## 2021-03-17 ENCOUNTER — HOSPITAL ENCOUNTER (OUTPATIENT)
Dept: CT IMAGING | Age: 61
Discharge: HOME | End: 2021-03-17
Payer: COMMERCIAL

## 2021-03-17 ENCOUNTER — OFFICE VISIT (OUTPATIENT)
Dept: INTERNAL MEDICINE CLINIC | Age: 61
End: 2021-03-17
Payer: COMMERCIAL

## 2021-03-17 VITALS
WEIGHT: 141 LBS | SYSTOLIC BLOOD PRESSURE: 128 MMHG | TEMPERATURE: 97.4 F | BODY MASS INDEX: 23.49 KG/M2 | OXYGEN SATURATION: 98 % | HEART RATE: 64 BPM | HEIGHT: 65 IN | DIASTOLIC BLOOD PRESSURE: 74 MMHG

## 2021-03-17 DIAGNOSIS — F41.9 ANXIETY: ICD-10-CM

## 2021-03-17 DIAGNOSIS — E78.2 MIXED HYPERLIPIDEMIA: Primary | ICD-10-CM

## 2021-03-17 DIAGNOSIS — M79.10 MYALGIA: ICD-10-CM

## 2021-03-17 DIAGNOSIS — Z87.891 HISTORY OF TOBACCO USE: ICD-10-CM

## 2021-03-17 DIAGNOSIS — R91.8 MULTIPLE SUBSOLID LUNG NODULES LESS THAN 6 MM IN DIAMETER: ICD-10-CM

## 2021-03-17 PROCEDURE — 1036F TOBACCO NON-USER: CPT | Performed by: INTERNAL MEDICINE

## 2021-03-17 PROCEDURE — 99214 OFFICE O/P EST MOD 30 MIN: CPT | Performed by: INTERNAL MEDICINE

## 2021-03-17 PROCEDURE — G8482 FLU IMMUNIZE ORDER/ADMIN: HCPCS | Performed by: INTERNAL MEDICINE

## 2021-03-17 PROCEDURE — G8427 DOCREV CUR MEDS BY ELIG CLIN: HCPCS | Performed by: INTERNAL MEDICINE

## 2021-03-17 PROCEDURE — G8420 CALC BMI NORM PARAMETERS: HCPCS | Performed by: INTERNAL MEDICINE

## 2021-03-17 PROCEDURE — 71271 CT THORAX LUNG CANCER SCR C-: CPT

## 2021-03-17 PROCEDURE — 3017F COLORECTAL CA SCREEN DOC REV: CPT | Performed by: INTERNAL MEDICINE

## 2021-03-17 ASSESSMENT — PATIENT HEALTH QUESTIONNAIRE - PHQ9
1. LITTLE INTEREST OR PLEASURE IN DOING THINGS: 0
SUM OF ALL RESPONSES TO PHQ QUESTIONS 1-9: 0
2. FEELING DOWN, DEPRESSED OR HOPELESS: 0
SUM OF ALL RESPONSES TO PHQ QUESTIONS 1-9: 0

## 2021-03-17 NOTE — PROGRESS NOTES
Ryder Gutierrez  YOB: 1960    Date of Service:  3/17/2021    Chief Complaint:      Chief Complaint   Patient presents with    Hyperlipidemia    Depression       HPI:  Ryder Gutierrez is a 61 y.o. She complain of bilateral hand, back and knee pain for the past couple of months. Wakes up very stiff and achy. Hyperlipidemia:  No new myalgias or GI upset on atorvastatin (Lipitor) 10 mg qd. Medication compliance: compliant most of the time. Patient is  following a low fat, low cholesterol diet.  She is  exercising regularly. Anxiety:  Improved on Wellbutrin  mg qd and has not smoke since July 2019.     Lab Results   Component Value Date    LABA1C 6.1 09/16/2020    LABA1C 5.8 02/06/2018    LABMICR Yes 12/29/2014     Lab Results   Component Value Date     09/16/2020    K 5.3 (H) 09/16/2020     09/16/2020    CO2 25 09/16/2020    BUN 13 09/16/2020    CREATININE <0.5 (L) 09/16/2020    GLUCOSE 118 (H) 09/16/2020    CALCIUM 10.0 09/16/2020     Lab Results   Component Value Date    CHOL 217 09/16/2020    TRIG 85 09/16/2020    HDL 83 09/16/2020    LDLCALC 117 09/16/2020     Lab Results   Component Value Date    ALT 45 (H) 09/16/2020    AST 27 09/16/2020     No results found for: TSH, T4FREE  Lab Results   Component Value Date    WBC 6.1 09/16/2020    HGB 13.4 09/16/2020    HCT 41.0 09/16/2020    MCV 91.7 09/16/2020     09/16/2020     Lab Results   Component Value Date    INR 1.04 05/19/2014      No results found for: PSA   No results found for: LABURIC     Patient Active Problem List   Diagnosis    Anxiety    Mixed hyperlipidemia    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       No Known Allergies  Outpatient Medications Marked as Taking for the 3/17/21 encounter (Office Visit) with Mandie Cabrera MD   Medication Sig Dispense Refill    atorvastatin (LIPITOR) 20 MG tablet Take 1 tablet by mouth daily 30 tablet 5    buPROPion (WELLBUTRIN XL) 300 MG extended release tablet Take 1 tablet by mouth every morning 30 tablet 11    vitamin D (CHOLECALCIFEROL) 1000 UNIT TABS tablet Take 1,000 Units by mouth daily      FIBER PO Take 1 tablet by mouth daily      Calcium Carbonate-Vit D-Min (CALCIUM 1200) 7235-0734 MG-UNIT CHEW Take by mouth      Probiotic Product (PROBIOTIC DAILY PO) Take  by mouth. Review of Systems: 14 systems were negative except of what was stated on HPI    Nursing note and vitals reviewed. Vitals:    03/17/21 1101   BP: 128/74   Pulse: 64   Temp: 97.4 °F (36.3 °C)   TempSrc: Infrared   SpO2: 98%   Weight: 141 lb (64 kg)   Height: 5' 5\" (1.651 m)     Wt Readings from Last 3 Encounters:   03/17/21 141 lb (64 kg)   09/16/20 147 lb (66.7 kg)   10/27/19 130 lb (59 kg)     BP Readings from Last 3 Encounters:   03/17/21 128/74   09/16/20 136/84   10/27/19 (!) 155/90     Body mass index is 23.46 kg/m². Constitutional: Patient appears well-developed and well-nourished. No distress. Head: Normocephalic and atraumatic. Neck: Normal range of motion. Neck supple. No thyroidmegaly. Cardiovascular: Normal rate, regular rhythm, normal heart sounds and intact distal pulses. Pulmonary/Chest: Effort normal and breath sounds normal. No stridor. No respiratory distress. No wheezes and no rales. Abdominal: Soft. Bowel sounds are normal. No distension and no mass. No tenderness. No rebound and no guarding. Musculoskeletal: No edema and no tenderness. Skin: No rash or erythema. Psychiatric: Normal mood and affect. Behavior is normal.     Assessment/Plan:  Melchor Kang was seen today for hyperlipidemia and depression. Diagnoses and all orders for this visit:    Mixed hyperlipidemia  Stop Lipitor to see if pain improves    Myalgia  Start Aleve 2 bid if stopping statin doesn't help    Anxiety  Stable and continue on current medications.     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

## 2021-04-27 DIAGNOSIS — E78.2 MIXED HYPERLIPIDEMIA: ICD-10-CM

## 2021-04-27 RX ORDER — ATORVASTATIN CALCIUM 20 MG/1
20 TABLET, FILM COATED ORAL DAILY
Qty: 30 TABLET | Refills: 4 | Status: SHIPPED | OUTPATIENT
Start: 2021-04-27 | End: 2021-09-16 | Stop reason: SDUPTHER

## 2021-06-23 ENCOUNTER — NURSE ONLY (OUTPATIENT)
Dept: INTERNAL MEDICINE CLINIC | Age: 61
End: 2021-06-23
Payer: COMMERCIAL

## 2021-06-23 DIAGNOSIS — Z23 NEED FOR SHINGLES VACCINE: Primary | ICD-10-CM

## 2021-06-23 PROCEDURE — 90471 IMMUNIZATION ADMIN: CPT | Performed by: INTERNAL MEDICINE

## 2021-06-23 PROCEDURE — 90750 HZV VACC RECOMBINANT IM: CPT | Performed by: INTERNAL MEDICINE

## 2021-09-08 ENCOUNTER — NURSE ONLY (OUTPATIENT)
Dept: INTERNAL MEDICINE CLINIC | Age: 61
End: 2021-09-08
Payer: COMMERCIAL

## 2021-09-08 DIAGNOSIS — Z23 NEED FOR SHINGLES VACCINE: Primary | ICD-10-CM

## 2021-09-08 PROCEDURE — 90471 IMMUNIZATION ADMIN: CPT | Performed by: INTERNAL MEDICINE

## 2021-09-08 PROCEDURE — 90750 HZV VACC RECOMBINANT IM: CPT | Performed by: INTERNAL MEDICINE

## 2021-09-15 ENCOUNTER — OFFICE VISIT (OUTPATIENT)
Dept: INTERNAL MEDICINE CLINIC | Age: 61
End: 2021-09-15
Payer: COMMERCIAL

## 2021-09-15 VITALS
HEIGHT: 65 IN | OXYGEN SATURATION: 98 % | DIASTOLIC BLOOD PRESSURE: 68 MMHG | WEIGHT: 141 LBS | SYSTOLIC BLOOD PRESSURE: 120 MMHG | HEART RATE: 75 BPM | BODY MASS INDEX: 23.49 KG/M2

## 2021-09-15 DIAGNOSIS — Z12.11 SCREEN FOR COLON CANCER: ICD-10-CM

## 2021-09-15 DIAGNOSIS — F41.9 ANXIETY: ICD-10-CM

## 2021-09-15 DIAGNOSIS — Z00.00 ANNUAL PHYSICAL EXAM: Primary | ICD-10-CM

## 2021-09-15 DIAGNOSIS — E78.2 MIXED HYPERLIPIDEMIA: ICD-10-CM

## 2021-09-15 LAB
A/G RATIO: 1.8 (ref 1.1–2.2)
ALBUMIN SERPL-MCNC: 4.6 G/DL (ref 3.4–5)
ALP BLD-CCNC: 115 U/L (ref 40–129)
ALT SERPL-CCNC: 46 U/L (ref 10–40)
ANION GAP SERPL CALCULATED.3IONS-SCNC: 11 MMOL/L (ref 3–16)
AST SERPL-CCNC: 35 U/L (ref 15–37)
BASOPHILS ABSOLUTE: 0.1 K/UL (ref 0–0.2)
BASOPHILS RELATIVE PERCENT: 1.2 %
BILIRUB SERPL-MCNC: <0.2 MG/DL (ref 0–1)
BUN BLDV-MCNC: 15 MG/DL (ref 7–20)
CALCIUM SERPL-MCNC: 9.8 MG/DL (ref 8.3–10.6)
CHLORIDE BLD-SCNC: 104 MMOL/L (ref 99–110)
CHOLESTEROL, TOTAL: 207 MG/DL (ref 0–199)
CO2: 23 MMOL/L (ref 21–32)
CREAT SERPL-MCNC: <0.5 MG/DL (ref 0.6–1.2)
EOSINOPHILS ABSOLUTE: 0.3 K/UL (ref 0–0.6)
EOSINOPHILS RELATIVE PERCENT: 4.7 %
GFR AFRICAN AMERICAN: >60
GFR NON-AFRICAN AMERICAN: >60
GLOBULIN: 2.5 G/DL
GLUCOSE BLD-MCNC: 110 MG/DL (ref 70–99)
HCT VFR BLD CALC: 40.2 % (ref 36–48)
HDLC SERPL-MCNC: 79 MG/DL (ref 40–60)
HEMOGLOBIN: 13.4 G/DL (ref 12–16)
LDL CHOLESTEROL CALCULATED: 108 MG/DL
LYMPHOCYTES ABSOLUTE: 2.9 K/UL (ref 1–5.1)
LYMPHOCYTES RELATIVE PERCENT: 44.8 %
MCH RBC QN AUTO: 30.8 PG (ref 26–34)
MCHC RBC AUTO-ENTMCNC: 33.2 G/DL (ref 31–36)
MCV RBC AUTO: 92.7 FL (ref 80–100)
MONOCYTES ABSOLUTE: 0.4 K/UL (ref 0–1.3)
MONOCYTES RELATIVE PERCENT: 6.1 %
NEUTROPHILS ABSOLUTE: 2.8 K/UL (ref 1.7–7.7)
NEUTROPHILS RELATIVE PERCENT: 43.2 %
PDW BLD-RTO: 14.3 % (ref 12.4–15.4)
PLATELET # BLD: 279 K/UL (ref 135–450)
PMV BLD AUTO: 9 FL (ref 5–10.5)
POTASSIUM SERPL-SCNC: 4.9 MMOL/L (ref 3.5–5.1)
RBC # BLD: 4.34 M/UL (ref 4–5.2)
SODIUM BLD-SCNC: 138 MMOL/L (ref 136–145)
TOTAL PROTEIN: 7.1 G/DL (ref 6.4–8.2)
TRIGL SERPL-MCNC: 102 MG/DL (ref 0–150)
VLDLC SERPL CALC-MCNC: 20 MG/DL
WBC # BLD: 6.4 K/UL (ref 4–11)

## 2021-09-15 PROCEDURE — 99396 PREV VISIT EST AGE 40-64: CPT | Performed by: INTERNAL MEDICINE

## 2021-09-15 PROCEDURE — 36415 COLL VENOUS BLD VENIPUNCTURE: CPT | Performed by: INTERNAL MEDICINE

## 2021-09-15 RX ORDER — BUPROPION HYDROCHLORIDE 300 MG/1
300 TABLET ORAL EVERY MORNING
Qty: 30 TABLET | Refills: 11 | Status: SHIPPED | OUTPATIENT
Start: 2021-09-15 | End: 2022-09-14 | Stop reason: SDUPTHER

## 2021-09-15 SDOH — ECONOMIC STABILITY: FOOD INSECURITY: WITHIN THE PAST 12 MONTHS, THE FOOD YOU BOUGHT JUST DIDN'T LAST AND YOU DIDN'T HAVE MONEY TO GET MORE.: NEVER TRUE

## 2021-09-15 SDOH — ECONOMIC STABILITY: FOOD INSECURITY: WITHIN THE PAST 12 MONTHS, YOU WORRIED THAT YOUR FOOD WOULD RUN OUT BEFORE YOU GOT MONEY TO BUY MORE.: NEVER TRUE

## 2021-09-15 ASSESSMENT — SOCIAL DETERMINANTS OF HEALTH (SDOH): HOW HARD IS IT FOR YOU TO PAY FOR THE VERY BASICS LIKE FOOD, HOUSING, MEDICAL CARE, AND HEATING?: NOT HARD AT ALL

## 2021-09-15 NOTE — PROGRESS NOTES
[facility]  History and Physical  Kath Thompson M.D. Simón Rendon  YOB: 1960    Date of Service:  9/15/2021    Chief Complaint:   Simón Rendon is a 61 y.o. female who presents for   Chief Complaint   Patient presents with    Annual Exam       Assessment/Plan:    Jessica Vincent was seen today for annual exam.    Diagnoses and all orders for this visit:    Annual physical exam  -     Lipid Panel  -     Comprehensive Metabolic Panel  -     CBC Auto Differential  -     Hemoglobin A1C    Screen for colon cancer  -     Cologuard (For External Results Only); Future    Anxiety  -     buPROPion (WELLBUTRIN XL) 300 MG extended release tablet; Take 1 tablet by mouth every morning    Mixed hyperlipidemia  -     atorvastatin (LIPITOR) 20 MG tablet; Take 1 tablet by mouth daily        Return VV 6 months anxiety and cholesterol. HPI: Here for Annual Physical and Follow up. Hyperlipidemia:  No new myalgias or GI upset on atorvastatin (Lipitor) 10 mg qd. Medication compliance: compliant most of the time. Patient is  following a low fat, low cholesterol diet.  She is  exercising regularly. Anxiety:  Improved on Wellbutrin  mg qd and has not smoke since July 2019.     Lab Results   Component Value Date    LABA1C 6.1 09/16/2020    LABA1C 5.8 02/06/2018    LABMICR Yes 12/29/2014     Lab Results   Component Value Date     09/16/2020    K 5.3 (H) 09/16/2020     09/16/2020    CO2 25 09/16/2020    BUN 13 09/16/2020    CREATININE <0.5 (L) 09/16/2020    GLUCOSE 118 (H) 09/16/2020    CALCIUM 10.0 09/16/2020     Lab Results   Component Value Date    CHOL 217 09/16/2020    TRIG 85 09/16/2020    HDL 83 09/16/2020    LDLCALC 117 09/16/2020     Lab Results   Component Value Date    ALT 45 (H) 09/16/2020    AST 27 09/16/2020     No results found for: TSH, T4FREE  Lab Results   Component Value Date    WBC 6.1 09/16/2020    HGB 13.4 09/16/2020    HCT 41.0 09/16/2020    MCV 91.7 09/16/2020     09/16/2020     Lab Results   Component Value Date    INR 1.04 05/19/2014      No results found for: PSA   No results found for: LABURIC     Wt Readings from Last 3 Encounters:   09/15/21 141 lb (64 kg)   03/17/21 141 lb (64 kg)   09/16/20 147 lb (66.7 kg)     BP Readings from Last 3 Encounters:   09/15/21 120/68   03/17/21 128/74   09/16/20 136/84       Patient Active Problem List   Diagnosis    Anxiety    Mixed hyperlipidemia    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       No Known Allergies  Outpatient Medications Marked as Taking for the 9/15/21 encounter (Office Visit) with Jim Pedroza MD   Medication Sig Dispense Refill    atorvastatin (LIPITOR) 20 MG tablet Take 1 tablet by mouth daily 30 tablet 4    buPROPion (WELLBUTRIN XL) 300 MG extended release tablet Take 1 tablet by mouth every morning 30 tablet 11    vitamin D (CHOLECALCIFEROL) 1000 UNIT TABS tablet Take 1,000 Units by mouth daily      FIBER PO Take 1 tablet by mouth daily      Calcium Carbonate-Vit D-Min (CALCIUM 1200) 4616-8694 MG-UNIT CHEW Take by mouth      Probiotic Product (PROBIOTIC DAILY PO) Take  by mouth.          Past Medical History:   Diagnosis Date    Anxiety     Diverticulitis     Mixed hyperlipidemia 2/20/2018     Past Surgical History:   Procedure Laterality Date    COLONOSCOPY  4/2/2015    INGUINAL HERNIA REPAIR Right 1996     Family History   Problem Relation Age of Onset    Other Mother         osteoporosis    Cancer Mother         Lung and Liver    Allergies Sister     Cancer Maternal Grandmother         breast cancer     Brain Cancer Niece      Social History     Socioeconomic History    Marital status:      Spouse name: Not on file    Number of children: Not on file    Years of education: Not on file    Highest education level: Not on file   Occupational History    Not on file   Tobacco Use    Smoking status: Former Smoker     Packs/day: 1.00     Years: 40.00     Pack years: 40.00     Types: Cigarettes     Quit date: 2019     Years since quittin.2    Smokeless tobacco: Never Used   Substance and Sexual Activity    Alcohol use: Yes     Alcohol/week: 6.0 standard drinks     Types: 6 Cans of beer per week     Comment: socially    Drug use: No    Sexual activity: Yes     Partners: Male   Other Topics Concern    Not on file   Social History Narrative    Not on file     Social Determinants of Health     Financial Resource Strain: Low Risk     Difficulty of Paying Living Expenses: Not hard at all   Food Insecurity: No Food Insecurity    Worried About [address] [employer] in the Last Year: Never true    [facility] in the Last Year: Never true   Transportation Needs:     Lack of Transportation (Medical):  Lack of Transportation (Non-Medical):    Physical Activity:     Days of Exercise per Week:     Minutes of Exercise per Session:    Stress:     Feeling of Stress :    Social Connections:     Frequency of Communication with Friends and Family:     Frequency of Social Gatherings with Friends and Family:     Attends Jewish Services:     Active Member of Clubs or Organizations:     Attends Club or Organization Meetings:     Marital Status:    Intimate Partner Violence:     Fear of Current or Ex-Partner:     Emotionally Abused:     Physically Abused:     Sexually Abused:        Review of Systems:  A comprehensive review of systems was negative except for what was noted in the HPI. Physical Exam:   Vitals:    09/15/21 1040   BP: 120/68   Pulse: 75   SpO2: 98%   Weight: 141 lb (64 kg)   Height: 5' 5\" (1.651 m)     Body mass index is 23.46 kg/m². Constitutional: She is oriented to person, place, and time. She appears well-developed and well-nourished. No distress. HEENT:   Head: Normocephalic and atraumatic.    Right Ear: Tympanic membrane, external ear and ear canal normal.   Left Ear: Tympanic membrane, external ear and ear canal normal.   Mouth/Throat: Oropharynx is clear and moist, and mucous membranes are normal.  There is no cervical adenopathy. Eyes: Conjunctivae and extraocular motions are normal. Pupils are equal, round, and reactive to light. Neck: Supple. No JVD present. Carotid bruit is not present. No mass and no thyromegaly present. Cardiovascular: Normal rate, regular rhythm, normal heart sounds and intact distal pulses. Exam reveals no gallop and no friction rub. No murmur heard. Pulmonary/Chest: Effort normal and breath sounds normal. No respiratory distress. She has no wheezes, rhonchi or rales. Abdominal: Soft, non-tender. Bowel sounds and aorta are normal. She exhibits no organomegaly, mass or bruit. Musculoskeletal: Normal range of motion, no synovitis. She exhibits no edema. Neurological: She is alert and oriented to person, place, and time. She has normal reflexes. No cranial nerve deficit. Coordination normal.   Skin: Skin is warm and dry. There is no rash or erythema. No suspicious lesions noted. Psychiatric: She has a normal mood and affect.  Her speech is normal and behavior is normal. Judgment, cognition and memory are normal.       Preventive Care:  Health Maintenance   Topic Date Due    Colon cancer screen colonoscopy  04/02/2018    Cervical cancer screen  05/24/2021    Flu vaccine (1) 09/01/2021    A1C test (Diabetic or Prediabetic)  09/16/2021    Lipid screen  09/16/2021    Low dose CT lung screening  03/17/2022    Breast cancer screen  02/03/2023    DTaP/Tdap/Td vaccine (2 - Td or Tdap) 01/23/2028    Shingles Vaccine  Completed    COVID-19 Vaccine  Completed    Hepatitis C screen  Addressed    HIV screen  Addressed    Hepatitis A vaccine  Aged Out    Hepatitis B vaccine  Aged Out    Hib vaccine  Aged Out    Meningococcal (ACWY) vaccine  Aged Out    Pneumococcal 0-64 years Vaccine  Aged Out      Hx abnormal PAP: no  Sexual activity: has sex with males   Last eye exam: 2021, normal  Exercise: walks 5 time(s) per week       Preventive plan of care for aMrcin Aquino        9/15/2021           Preventive Measures Status       Recommendations for screening                   Diabetes Screen  Glucose (mg/dL)   Date Value   09/16/2020 118 (H)    Test recommended and ordered   Cholesterol Screen  Lab Results   Component Value Date    CHOL 217 (H) 09/16/2020    TRIG 85 09/16/2020    HDL 83 (H) 09/16/2020    LDLCALC 117 (H) 09/16/2020    Test recommended and ordered       Weight: Body mass index is 23.46 kg/m².   5' 5\" (1.651 m)141 lb (64 kg)    Your BMI is between 18.5 and 24.9, which indicates that you are at a healthy weight        Recommended Immunizations    Immunization History   Administered Date(s) Administered    COVID-19, Pfizer, PF, 30mcg/0.3mL 03/11/2021, 04/01/2021    Influenza, Quadv, IM, (6 mo and older Fluzone, Flulaval, Fluarix and 3 yrs and older Afluria) 10/02/2019, 09/16/2020    Pneumococcal Polysaccharide ([id number]) 05/02/2016    Tdap (Boostrix, Adacel) 01/23/2018    Zoster Recombinant (Shingrix) 06/23/2021, 09/08/2021        Influenza vaccine:  recommended every fall         Other Recommendations ·   See a dentist every 6 months  · Try to get at least 30 minutes of exercise 3-5 days per week  · Always wear a seat belt when traveling in a car  · Always wear a helmet when riding a bicycle or motorcycle  · When exposed to the sun, use a sunscreen that protects against both UVA and UVB radiation with an SPF of 30 or greater- reapply every 2 to 3 hours or after sweating, drying off with a towel, or swimming  · You need 6207-1672 mg of calcium and 6192-5207 IU of vitamin D per day- it is possible to meet your calcium requirement with diet alone, but a vitamin D supplement is usually necessary

## 2021-09-16 LAB
ESTIMATED AVERAGE GLUCOSE: 116.9 MG/DL
HBA1C MFR BLD: 5.7 %

## 2021-09-16 RX ORDER — ATORVASTATIN CALCIUM 20 MG/1
20 TABLET, FILM COATED ORAL DAILY
Qty: 30 TABLET | Refills: 5 | Status: SHIPPED | OUTPATIENT
Start: 2021-09-16 | End: 2022-06-08 | Stop reason: SDUPTHER

## 2021-12-08 ENCOUNTER — OFFICE VISIT (OUTPATIENT)
Dept: INTERNAL MEDICINE CLINIC | Age: 61
End: 2021-12-08
Payer: COMMERCIAL

## 2021-12-08 VITALS
HEART RATE: 74 BPM | WEIGHT: 145 LBS | DIASTOLIC BLOOD PRESSURE: 62 MMHG | OXYGEN SATURATION: 98 % | SYSTOLIC BLOOD PRESSURE: 118 MMHG | BODY MASS INDEX: 24.16 KG/M2 | HEIGHT: 65 IN

## 2021-12-08 DIAGNOSIS — K40.90 LEFT INGUINAL HERNIA: ICD-10-CM

## 2021-12-08 DIAGNOSIS — Z12.4 ROUTINE PAPANICOLAOU SMEAR: Primary | ICD-10-CM

## 2021-12-08 PROCEDURE — 99396 PREV VISIT EST AGE 40-64: CPT | Performed by: INTERNAL MEDICINE

## 2021-12-08 PROCEDURE — G8484 FLU IMMUNIZE NO ADMIN: HCPCS | Performed by: INTERNAL MEDICINE

## 2021-12-08 NOTE — PROGRESS NOTES
Annual GYN Visit      Mercy Rogers  YOB: 1960    Date of Service:  12/8/2021    Chief Complaint:   Mercy Rogers is a 64 y.o. female who presents for routine annual gynecologic visit. Assessment/Plan:    Keny Mensah was seen today for gynecologic exam.    Diagnoses and all orders for this visit:    Routine Papanicolaou smear  -     PAP SMEAR    Left inguinal hernia  -     Jennifer Booker MD, General Surgery, [facility]      Return VV before mid march mood and cholesterol. HPI:  Patient is postmenopausal- Patient's last menstrual period was 01/01/2014. .  She denies irregular menses, pelvic pain, genital ulcers, vaginal discharge, vulvar/vaginal itching, vulvar/vaginal irritation/pain, urinary frequency and urinary urgency. Menopausal/perimenopausal symptoms: none. Hormone therapy side effects: none, not applicable. She complain of left inguinal hernia for couple of years. She does lift at work and feels it bulging out. She's had right inguinal hernia repair in the past so know how it feels. Patient Active Problem List   Diagnosis    Anxiety    Mixed hyperlipidemia    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       No Known Allergies  Outpatient Medications Marked as Taking for the 12/8/21 encounter (Office Visit) with Baron Melissa Cabrera MD   Medication Sig Dispense Refill    atorvastatin (LIPITOR) 20 MG tablet Take 1 tablet by mouth daily 30 tablet 5    buPROPion (WELLBUTRIN XL) 300 MG extended release tablet Take 1 tablet by mouth every morning 30 tablet 11    vitamin D (CHOLECALCIFEROL) 1000 UNIT TABS tablet Take 1,000 Units by mouth daily      FIBER PO Take 1 tablet by mouth daily      Calcium Carbonate-Vit D-Min (CALCIUM 1200) 5854-7835 MG-UNIT CHEW Take by mouth      Probiotic Product (PROBIOTIC DAILY PO) Take  by mouth.          Past Medical History:   Diagnosis Date    Anxiety     Diverticulitis     Mixed hyperlipidemia 2/20/2018 Past Surgical History:   Procedure Laterality Date    COLONOSCOPY  2015    INGUINAL HERNIA REPAIR Right 1996     Family History   Problem Relation Age of Onset    Other Mother         osteoporosis    Cancer Mother         Lung and Liver    Allergies Sister     Cancer Maternal Grandmother         breast cancer     Brain Cancer Niece      Social History     Socioeconomic History    Marital status:      Spouse name: Not on file    Number of children: Not on file    Years of education: Not on file    Highest education level: Not on file   Occupational History    Not on file   Tobacco Use    Smoking status: Former Smoker     Packs/day: 1.00     Years: 40.00     Pack years: 40.00     Types: Cigarettes     Quit date: 2019     Years since quittin.4    Smokeless tobacco: Never Used   Substance and Sexual Activity    Alcohol use: Yes     Alcohol/week: 6.0 standard drinks     Types: 6 Cans of beer per week     Comment: socially    Drug use: No    Sexual activity: Yes     Partners: Male   Other Topics Concern    Not on file   Social History Narrative    Not on file     Social Determinants of Health     Financial Resource Strain: Low Risk     Difficulty of Paying Living Expenses: Not hard at all   Food Insecurity: No Food Insecurity    Worried About [address] [employer] in the Last Year: Never true    [facility] [facility] in the Last Year: Never true   Transportation Needs:     Lack of Transportation (Medical): Not on file    Lack of Transportation (Non-Medical):  Not on file   Physical Activity:     Days of Exercise per Week: Not on file    Minutes of Exercise per Session: Not on file   Stress:     Feeling of Stress : Not on file   Social Connections:     Frequency of Communication with Friends and Family: Not on file    Frequency of Social Gatherings with Friends and Family: Not on file    Attends Latter-day Services: Not on file    Active Member of Clubs or Organizations: Not on file    Attends Club or Organization Meetings: Not on file    Marital Status: Not on file   Intimate Partner Violence:     Fear of Current or Ex-Partner: Not on file    Emotionally Abused: Not on file    Physically Abused: Not on file    Sexually Abused: Not on file   Housing Stability:     Unable to Pay for Housing in the Last Year: Not on file    Number of [place] in the Last Year: Not on file    Unstable Housing in the Last Year: Not on file       Preventive Care and Risk Factor Assessment  Health Maintenance   Topic Date Due    Cervical cancer screen  2021    Low dose CT lung screening  2022    A1C test (Diabetic or Prediabetic)  09/15/2022    Lipid screen  09/15/2022    Breast cancer screen  2023    Colon cancer screen colonoscopy  10/05/2024    DTaP/Tdap/Td vaccine (2 - Td or Tdap) 2028    Flu vaccine  Completed    Shingles Vaccine  Completed    COVID-19 Vaccine  Completed    Hepatitis C screen  Addressed    HIV screen  Addressed    Hepatitis A vaccine  Aged Out    Hepatitis B vaccine  Aged Out    Hib vaccine  Aged Out    Meningococcal (ACWY) vaccine  Aged Out    Pneumococcal 0-64 years Vaccine  Aged Out      Hx abnormal PAP: no  Sexual activity: has sex with males.   Hx of STD: no    Social History     Tobacco Use   Smoking Status Former Smoker    Packs/day: 1.00    Years: 40.00    Pack years: 40.00    Types: Cigarettes    Quit date: 2019    Years since quittin.4   Smokeless Tobacco Never Used      Social History     Substance and Sexual Activity   Alcohol Use Yes    Alcohol/week: 6.0 standard drinks    Types: 6 Cans of beer per week    Comment: socially        Immunization History   Administered Date(s) Administered    COVID-19, Pfizer, PF, 30mcg/0.3mL 2021, 2021, 2021    Influenza, Quadv, IM, (6 mo and older Fluzone, Flulaval, Fluarix and 3 yrs and older Afluria) 10/02/2019, 2020    Pneumococcal Polysaccharide ([id number]) 05/02/2016    Tdap (Boostrix, Adacel) 01/23/2018    Zoster Recombinant (Shingrix) 06/23/2021, 09/08/2021       Review of Systems:  A comprehensive review of systems was negative except for what was noted in the HPI. Wt Readings from Last 3 Encounters:   12/08/21 145 lb (65.8 kg)   09/15/21 141 lb (64 kg)   03/17/21 141 lb (64 kg)     BP Readings from Last 3 Encounters:   12/08/21 118/62   09/15/21 120/68   03/17/21 128/74       Physical Exam:  Vitals:    12/08/21 1110   BP: 118/62   Pulse: 74   SpO2: 98%   Weight: 145 lb (65.8 kg)   Height: 5' 5\" (1.651 m)      Body mass index is 24.13 kg/m². Constitutional: She is oriented to person, place, and time. She appears well-developed and well-nourished. No distress. Neck: No mass and no thyromegaly present. Cardiovascular: Normal rate, regular rhythm and normal heart sounds. No murmur heard. Pulmonary/Chest: Effort and breath sounds normal.   Abdominal: Soft, non-tender. No distension or masses. Genitourinary: normal external genitalia, vulva, vagina, cervix, uterus and adnexa. Breast exam:  breasts appear normal, no suspicious masses, no skin or nipple changes or axillary nodes, no axillary lymphadenopathy  Neurological: She is alert and oriented to person, place, and time. Skin: Skin is warm and dry. No rash noted. No erythema.        Lab Results   Component Value Date    LABA1C 5.7 09/15/2021    LABA1C 6.1 09/16/2020    LABA1C 5.8 02/06/2018    LABMICR Yes 12/29/2014     Lab Results   Component Value Date     09/15/2021    K 4.9 09/15/2021     09/15/2021    CO2 23 09/15/2021    BUN 15 09/15/2021    CREATININE <0.5 (L) 09/15/2021    GLUCOSE 110 (H) 09/15/2021    CALCIUM 9.8 09/15/2021     Lab Results   Component Value Date    CHOL 207 09/15/2021    TRIG 102 09/15/2021    HDL 79 09/15/2021    LDLCALC 108 09/15/2021     Lab Results   Component Value Date    ALT 46 (H) 09/15/2021    AST 35 09/15/2021     No results found for: TSH, T4FREE  Lab Results   Component Value Date    WBC 6.4 09/15/2021    HGB 13.4 09/15/2021    HCT 40.2 09/15/2021    MCV 92.7 09/15/2021     09/15/2021     Lab Results   Component Value Date    INR 1.04 05/19/2014      No results found for: PSA   No results found for: [facility]

## 2021-12-11 LAB
HPV COMMENT: NORMAL
HPV TYPE 16: NOT DETECTED
HPV TYPE 18: NOT DETECTED
HPVOH (OTHER TYPES): NOT DETECTED

## 2022-01-28 ENCOUNTER — INITIAL CONSULT (OUTPATIENT)
Dept: SURGERY | Age: 62
End: 2022-01-28
Payer: COMMERCIAL

## 2022-01-28 VITALS
BODY MASS INDEX: 24.16 KG/M2 | WEIGHT: 145 LBS | TEMPERATURE: 97 F | HEIGHT: 65 IN | DIASTOLIC BLOOD PRESSURE: 70 MMHG | SYSTOLIC BLOOD PRESSURE: 131 MMHG | HEART RATE: 122 BPM

## 2022-01-28 DIAGNOSIS — K40.90 NON-RECURRENT UNILATERAL INGUINAL HERNIA WITHOUT OBSTRUCTION OR GANGRENE: Primary | ICD-10-CM

## 2022-01-28 PROCEDURE — G8427 DOCREV CUR MEDS BY ELIG CLIN: HCPCS | Performed by: SURGERY

## 2022-01-28 PROCEDURE — 3017F COLORECTAL CA SCREEN DOC REV: CPT | Performed by: SURGERY

## 2022-01-28 PROCEDURE — G8420 CALC BMI NORM PARAMETERS: HCPCS | Performed by: SURGERY

## 2022-01-28 PROCEDURE — 99204 OFFICE O/P NEW MOD 45 MIN: CPT | Performed by: SURGERY

## 2022-01-28 PROCEDURE — G8484 FLU IMMUNIZE NO ADMIN: HCPCS | Performed by: SURGERY

## 2022-01-28 PROCEDURE — 1036F TOBACCO NON-USER: CPT | Performed by: SURGERY

## 2022-01-28 RX ORDER — SODIUM CHLORIDE 9 MG/ML
25 INJECTION, SOLUTION INTRAVENOUS PRN
Status: CANCELLED | OUTPATIENT
Start: 2022-01-28

## 2022-01-28 RX ORDER — SODIUM CHLORIDE 0.9 % (FLUSH) 0.9 %
5-40 SYRINGE (ML) INJECTION PRN
Status: CANCELLED | OUTPATIENT
Start: 2022-01-28

## 2022-01-28 RX ORDER — SODIUM CHLORIDE 0.9 % (FLUSH) 0.9 %
5-40 SYRINGE (ML) INJECTION EVERY 12 HOURS SCHEDULED
Status: CANCELLED | OUTPATIENT
Start: 2022-01-28

## 2022-01-28 NOTE — PATIENT INSTRUCTIONS
[address] [address]  Phone: [phone]  Fax: [phone] will be scheduled for surgery with Dr. Yary Lira. · The office will call you with the date and time that surgery is scheduled. · Please take note of these instructions for surgery:  · You should have nothing by mouth after midnight the night before your surgery - this includes no food or water. · Your surgery will be cancelled if you have taken anything by mouth after midnight, NO exceptions. · You will need to have a history and physical prior to your surgery. This will need to be completed up to 30 days before your surgery. This H/P can be completed by your family doctor or the hospital.   · IF you take coumadin (warfarin), please stop taking this medication 5 days prior to your surgery. · IF you take plavix, please stop taking this 7 days prior to your surgery. · Please contact our office if you have a pacemaker or defibrillator. · IF you are allergic to latex, please tell our office prior to your surgery. This is important in know before scheduling your surgery. · IF you are having an out patient surgery, you will need someone available to drive you home after your surgery, and to also stay with you for the rest of the day. · IF you are having a surgery requiring an inpatient stay in the hospital, you will need someone to drive you home upon discharge from the hospital.  · Please contact Dr. Sheree Bloch assistant Laura Crisostomo if you have any questions or concerns. · Please call the office with any changes in your symptoms or further questions/concerns.  [phone]

## 2022-01-28 NOTE — PROGRESS NOTES
New Patient [address] [place] [address] Drive Surgery Matthew IQBAL [address] [facility], [address] [facility], [address] [facility], [address] [facility]  Phone: [phone]  Fax: Brittanie Choi   YOB: 1960    Date of Visit:  1/28/2022    Rick CLARK MD    HPI:   Inguinal Hernia: Patient is 64y.o. year old female seen at request of Dee Harris MD.  Patient presents for evaluation of left left inguinal hernia. Symptoms were first noted a few months ago. Pain is dull, intermittent. Lump is reducible. Pt has no symptoms of  chronic constipation, chronic cough, difficulty urinating. Pt. has previous history of prior  Inguinal right hernia surgery. No Known Allergies  Outpatient Medications Marked as Taking for the 1/28/22 encounter (Initial consult) with Ember Peralta MD   Medication Sig Dispense Refill    atorvastatin (LIPITOR) 20 MG tablet Take 1 tablet by mouth daily 30 tablet 5    buPROPion (WELLBUTRIN XL) 300 MG extended release tablet Take 1 tablet by mouth every morning 30 tablet 11    vitamin D (CHOLECALCIFEROL) 1000 UNIT TABS tablet Take 1,000 Units by mouth daily      FIBER PO Take 1 tablet by mouth daily      Calcium Carbonate-Vit D-Min (CALCIUM 1200) 3543-1570 MG-UNIT CHEW Take by mouth      Probiotic Product (PROBIOTIC DAILY PO) Take  by mouth.          Past Medical History:   Diagnosis Date    Anxiety     Diverticulitis     Mixed hyperlipidemia 2/20/2018     Past Surgical History:   Procedure Laterality Date    COLONOSCOPY  4/2/2015    INGUINAL HERNIA REPAIR Right 1996     Family History   Problem Relation Age of Onset    Other Mother         osteoporosis    Cancer Mother         Lung and Liver    Allergies Sister     Cancer Maternal Grandmother         breast cancer     Brain Cancer Niece      Social History     Socioeconomic History    Marital status:      Spouse name: Not on file    Number of children: Not on file    Years of education: Not on file    Highest education level: Not on file   Occupational History    Not on file   Tobacco Use    Smoking status: Former Smoker     Packs/day: 1.00     Years: 40.00     Pack years: 40.00     Types: Cigarettes     Quit date: 2019     Years since quittin.5    Smokeless tobacco: Never Used   Substance and Sexual Activity    Alcohol use: Yes     Alcohol/week: 6.0 standard drinks     Types: 6 Cans of beer per week     Comment: socially    Drug use: No    Sexual activity: Yes     Partners: Male   Other Topics Concern    Not on file   Social History Narrative    Not on file     Social Determinants of Health     Financial Resource Strain: Low Risk     Difficulty of Paying Living Expenses: Not hard at all   Food Insecurity: No Food Insecurity    Worried About [address] in the Last Year: Never true    [address]  [employer] in the Last Year: Never true   Transportation Needs:     Lack of Transportation (Medical): Not on file    Lack of Transportation (Non-Medical):  Not on file   Physical Activity:     Days of Exercise per Week: Not on file    Minutes of Exercise per Session: Not on file   Stress:     Feeling of Stress : Not on file   Social Connections:     Frequency of Communication with Friends and Family: Not on file    Frequency of Social Gatherings with Friends and Family: Not on file    Attends [employer] Services: Not on file    Active Member of [address] [place] [address] or Organizations: Not on file    Attends Club or Organization Meetings: Not on file    Marital Status: Not on file   Intimate Partner Violence:     Fear of Current or Ex-Partner: Not on file    Emotionally Abused: Not on file    Physically Abused: Not on file    Sexually Abused: Not on file   Housing Stability:     Unable to Pay for Housing in the Last Year: Not on file    Number of [place] in the Last Year: Not on file    Unstable Housing in the Last Year: Not on file          A review of the patient's record including allergies, medication list, tobacco history, family history, problem list, medical history and social history has been completed and updates made to the patient's EMR where indicated. Vitals:    01/28/22 1250   BP: 131/70   Site: Right Wrist   Position: Sitting   Cuff Size: Medium Adult   Pulse: 122   Temp: 97 °F (36.1 °C)   Weight: 145 lb (65.8 kg)   Height: 5' 5\" (1.651 m)     Body mass index is 24.13 kg/m². Wt Readings from Last 3 Encounters:   01/28/22 145 lb (65.8 kg)   12/08/21 145 lb (65.8 kg)   09/15/21 141 lb (64 kg)     BP Readings from Last 3 Encounters:   01/28/22 131/70   12/08/21 118/62   09/15/21 120/68          REVIEW OF SYSTEMS:   · All other systems reviewed; please refer to HPI with pertinent positives, all other ROS are negative    PHYSICAL EXAM:    CONSTITUTIONAL:  awake, alert, no apparent distress and thin  ENT:  normocepalic, without obvious abnormality  NECK:  supple, symmetrical, trachea midline   LUNGS:  Resp easy and unlabored  CARDIOVASCULAR:     ABDOMEN:  scars noted right groin,  , soft, non-distended, non-tender, involuntary guarding absent,  Examination for hernias: left inguinal hernia, reducible, nontender, no right inguinal or umbilical hernias noted with Valsalva. MUSCULOSKELETAL: No edema  NEUROLOGIC:  Mental Status Exam:  Level of Alertness:   awake  Orientation:   person, place, time      DATA:       ASSESSMENT:     Diagnosis Orders   1. Non-recurrent unilateral inguinal hernia without obstruction or gangrene           PLAN:    We will schedule the pt for  left robotic preperitoneal inguinal hernia repair. The technical aspects, risks, benefits and complications of the procedure were discussed with the patient. The pt appears to understand, asks appropriate questions, and agrees to proceed with the procedure.        Chris Barajas MD

## 2022-02-20 ENCOUNTER — TELEPHONE (OUTPATIENT)
Dept: CASE MANAGEMENT | Age: 62
End: 2022-02-20

## 2022-02-20 NOTE — TELEPHONE ENCOUNTER
Patient due for annual CT Lung Screening. If you would like patient to have screening, please place order for CT Lung Screening ([facility] [id number]). Patient due for annual CT Lung Screening. Reminder letter mailed.       Thank you,  Moni Stanford RN  [facility] Lung Navigator  [phone]

## 2022-02-23 ENCOUNTER — HOSPITAL ENCOUNTER (OUTPATIENT)
Dept: WOMENS IMAGING | Age: 62
Discharge: HOME | End: 2022-02-23
Payer: COMMERCIAL

## 2022-02-23 VITALS — WEIGHT: 140 LBS | BODY MASS INDEX: 23.32 KG/M2 | HEIGHT: 65 IN

## 2022-02-23 DIAGNOSIS — Z12.31 VISIT FOR SCREENING MAMMOGRAM: ICD-10-CM

## 2022-02-23 PROCEDURE — 77067 SCR MAMMO BI INCL CAD: CPT

## 2022-03-02 ENCOUNTER — TELEPHONE (OUTPATIENT)
Dept: CASE MANAGEMENT | Age: 62
End: 2022-03-02

## 2022-03-21 ENCOUNTER — TELEPHONE (OUTPATIENT)
Dept: INTERNAL MEDICINE CLINIC | Age: 62
End: 2022-03-21

## 2022-03-21 DIAGNOSIS — Z87.891 H/O TOBACCO USE, PRESENTING HAZARDS TO HEALTH: Primary | ICD-10-CM

## 2022-03-21 NOTE — TELEPHONE ENCOUNTER
Patient received a message from nurse navigator to get her yearly lung screening done. Can you please place new order for her.

## 2022-04-13 ENCOUNTER — HOSPITAL ENCOUNTER (OUTPATIENT)
Dept: CT IMAGING | Age: 62
Discharge: HOME | End: 2022-04-13
Payer: COMMERCIAL

## 2022-04-13 DIAGNOSIS — Z87.891 H/O TOBACCO USE, PRESENTING HAZARDS TO HEALTH: ICD-10-CM

## 2022-04-13 PROCEDURE — 71271 CT THORAX LUNG CANCER SCR C-: CPT

## 2022-06-08 ENCOUNTER — OFFICE VISIT (OUTPATIENT)
Dept: INTERNAL MEDICINE CLINIC | Age: 62
End: 2022-06-08
Payer: COMMERCIAL

## 2022-06-08 VITALS
BODY MASS INDEX: 23.66 KG/M2 | HEART RATE: 66 BPM | DIASTOLIC BLOOD PRESSURE: 68 MMHG | HEIGHT: 65 IN | SYSTOLIC BLOOD PRESSURE: 124 MMHG | WEIGHT: 142 LBS | OXYGEN SATURATION: 98 %

## 2022-06-08 DIAGNOSIS — E78.2 MIXED HYPERLIPIDEMIA: Primary | ICD-10-CM

## 2022-06-08 DIAGNOSIS — R10.32 ABDOMINAL DISCOMFORT IN LEFT LOWER QUADRANT: ICD-10-CM

## 2022-06-08 DIAGNOSIS — I83.93 ASYMPTOMATIC VARICOSE VEINS OF BOTH LOWER EXTREMITIES: ICD-10-CM

## 2022-06-08 DIAGNOSIS — F41.9 ANXIETY: ICD-10-CM

## 2022-06-08 PROCEDURE — 1036F TOBACCO NON-USER: CPT | Performed by: INTERNAL MEDICINE

## 2022-06-08 PROCEDURE — 99214 OFFICE O/P EST MOD 30 MIN: CPT | Performed by: INTERNAL MEDICINE

## 2022-06-08 PROCEDURE — G8420 CALC BMI NORM PARAMETERS: HCPCS | Performed by: INTERNAL MEDICINE

## 2022-06-08 PROCEDURE — 3017F COLORECTAL CA SCREEN DOC REV: CPT | Performed by: INTERNAL MEDICINE

## 2022-06-08 PROCEDURE — G8427 DOCREV CUR MEDS BY ELIG CLIN: HCPCS | Performed by: INTERNAL MEDICINE

## 2022-06-08 RX ORDER — ATORVASTATIN CALCIUM 20 MG/1
20 TABLET, FILM COATED ORAL DAILY
Qty: 30 TABLET | Refills: 5 | Status: SHIPPED | OUTPATIENT
Start: 2022-06-08

## 2022-06-08 ASSESSMENT — PATIENT HEALTH QUESTIONNAIRE - PHQ9
SUM OF ALL RESPONSES TO PHQ QUESTIONS 1-9: 0
2. FEELING DOWN, DEPRESSED OR HOPELESS: 0
SUM OF ALL RESPONSES TO PHQ9 QUESTIONS 1 & 2: 0
1. LITTLE INTEREST OR PLEASURE IN DOING THINGS: 0

## 2022-06-08 NOTE — PROGRESS NOTES
Cholo Arroyo  YOB: 1960    Date of Service:  6/8/2022    Chief Complaint:      Chief Complaint   Patient presents with    Hyperlipidemia    Anxiety    Abdominal Pain     feels like she is having a flare of diverticulitis       Assessment/Plan:  Snushine Romero was seen today for hyperlipidemia, anxiety and abdominal pain. Diagnoses and all orders for this visit:    Mixed hyperlipidemia  -     atorvastatin (LIPITOR) 20 MG tablet; Take 1 tablet by mouth daily    Anxiety  Stable and continue on current treatment    Abdominal discomfort in left lower quadrant  Patient decline CBC with diff and will see if improve on its own since symptoms are mild    Asymptomatic varicose veins of both lower extremities  Monitor for now      Return Sept 14 at 11:30 Fasting Physical.      HPI:  Cholo Arroyo is a 64 y.o. She complains of pain left lower abdomen with diarrhea and low back for 2 days and normal bowel movement today. No fever or chills. Symptoms slowly improving. She complains of symptoms flaring up about every 2 years. She did have some diverticula and colitis on CT abdominal pain/pelvis back in 2014. She also complain of knots in both lower legs she noticed recently, no pain. Hyperlipidemia:  No new myalgias or GI upset on atorvastatin (Lipitor) 10 mg qd. Medication compliance: compliant most of the time. Patient is  following a low fat, low cholesterol diet.  She is  exercising regularly. Anxiety:  Improved on Wellbutrin  mg qd and has not smoke since July 2019.     Lab Results   Component Value Date    LABA1C 5.7 09/15/2021    LABA1C 6.1 09/16/2020    LABA1C 5.8 02/06/2018    LABMICR Yes 12/29/2014     Lab Results   Component Value Date     09/15/2021    K 4.9 09/15/2021     09/15/2021    CO2 23 09/15/2021    BUN 15 09/15/2021    CREATININE <0.5 (L) 09/15/2021    GLUCOSE 110 (H) 09/15/2021    CALCIUM 9.8 09/15/2021     Lab Results   Component Value Date CHOL 207 09/15/2021    TRIG 102 09/15/2021    HDL 79 09/15/2021    LDLCALC 108 09/15/2021     Lab Results   Component Value Date    ALT 46 (H) 09/15/2021    AST 35 09/15/2021     No results found for: TSH, T4FREE, T3FREE  Lab Results   Component Value Date    WBC 6.4 09/15/2021    HGB 13.4 09/15/2021    HCT 40.2 09/15/2021    MCV 92.7 09/15/2021     09/15/2021     Lab Results   Component Value Date    INR 1.04 05/19/2014      No results found for: PSA   No results found for: [facility]     Patient Active Problem List   Diagnosis    Anxiety    Mixed hyperlipidemia    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    Non-recurrent unilateral inguinal hernia without obstruction or gangrene       No Known Allergies  Outpatient Medications Marked as Taking for the 6/8/22 encounter (Office Visit) with Jim Pedroza MD   Medication Sig Dispense Refill    atorvastatin (LIPITOR) 20 MG tablet Take 1 tablet by mouth daily 30 tablet 5    buPROPion (WELLBUTRIN XL) 300 MG extended release tablet Take 1 tablet by mouth every morning 30 tablet 11    FIBER PO Take 1 tablet by mouth daily      Calcium Carbonate-Vit D-Min (CALCIUM 1200) 4169-3462 MG-UNIT CHEW Take by mouth      Probiotic Product (PROBIOTIC DAILY PO) Take  by mouth. Review of Systems: 14 systems were negative except of what was stated on HPI    Nursing note and vitals reviewed. Vitals:    06/08/22 1253   BP: 124/68   Pulse: 66   SpO2: 98%   Weight: 142 lb (64.4 kg)   Height: 5' 5\" (1.651 m)     Wt Readings from Last 3 Encounters:   06/08/22 142 lb (64.4 kg)   02/23/22 140 lb (63.5 kg)   01/28/22 145 lb (65.8 kg)     BP Readings from Last 3 Encounters:   06/08/22 124/68   01/28/22 131/70   12/08/21 118/62     Body mass index is 23.63 kg/m². Constitutional: Patient appears well-developed and well-nourished. No distress. Head: Normocephalic and atraumatic. Neck: Normal range of motion. Neck supple. No thyroidmegaly. Cardiovascular: Normal rate, regular rhythm, normal heart sounds and intact distal pulses. Pulmonary/Chest: Effort normal and breath sounds normal. No stridor. No respiratory distress. No wheezes and no rales. Abdominal: Soft. Bowel sounds are normal. No distension and no mass. Not much tenderness on palpation left lower abdomen. No rebound and no guarding. Musculoskeletal: No edema and no tenderness. Skin: No rash or erythema. Small areas of whitish swelling with standing and flattens where veins are seen when sitting. No pain. Mild varicose veins bilateral lower legs.

## 2022-08-24 ENCOUNTER — OFFICE VISIT (OUTPATIENT)
Dept: ORTHOPEDIC SURGERY | Age: 62
End: 2022-08-24
Payer: COMMERCIAL

## 2022-08-24 VITALS — WEIGHT: 138 LBS | HEIGHT: 65 IN | BODY MASS INDEX: 22.99 KG/M2

## 2022-08-24 DIAGNOSIS — M17.12 LOCALIZED OSTEOARTHRITIS OF LEFT KNEE: ICD-10-CM

## 2022-08-24 DIAGNOSIS — M25.562 LEFT KNEE PAIN, UNSPECIFIED CHRONICITY: Primary | ICD-10-CM

## 2022-08-24 PROCEDURE — G8420 CALC BMI NORM PARAMETERS: HCPCS | Performed by: PHYSICIAN ASSISTANT

## 2022-08-24 PROCEDURE — G8427 DOCREV CUR MEDS BY ELIG CLIN: HCPCS | Performed by: PHYSICIAN ASSISTANT

## 2022-08-24 PROCEDURE — 99203 OFFICE O/P NEW LOW 30 MIN: CPT | Performed by: PHYSICIAN ASSISTANT

## 2022-08-24 PROCEDURE — 3017F COLORECTAL CA SCREEN DOC REV: CPT | Performed by: PHYSICIAN ASSISTANT

## 2022-08-24 PROCEDURE — 1036F TOBACCO NON-USER: CPT | Performed by: PHYSICIAN ASSISTANT

## 2022-08-24 RX ORDER — METHYLPREDNISOLONE 4 MG/1
TABLET ORAL
Qty: 21 KIT | Refills: 0 | Status: SHIPPED | OUTPATIENT
Start: 2022-08-24 | End: 2022-09-14

## 2022-08-24 NOTE — PROGRESS NOTES
CHIEF COMPLAINT:    Chief Complaint   Patient presents with    Knee Pain     Left knee - has been a  for almost 40 years - so constant standing on feet on concrete. Has had knee pain for years. Pain is really in both knees, but last night had a giving out episode. Left worse than Right knee. Pain is mostly anterior in nature. HISTORY OF PRESENT ILLNESS:                The patient is a 64 y.o. female who presents today for left knee evaluation. Patient reports having many years of bilateral knee symptoms, mainly discomfort. Last night she took a step and felt her knee buckle. She had sharp pain in her left knee at that time. She continues to have pain in her left knee at rest that worsens with ambulation. She is treated her symptoms with ice, Aleve, and a compression sleeve. She currently works as a  and has to stand on concrete for multiple hours while working. She denies a history of diabetes.     The pain assessment was noted & is as follows:   ]    Past Medical History: Medical history form was reviewed today & can be found in the media tab  Past Medical History:   Diagnosis Date    Anxiety     Diverticulitis     Mixed hyperlipidemia 2/20/2018      Past Surgical History:     Past Surgical History:   Procedure Laterality Date    COLONOSCOPY  4/2/2015    INGUINAL HERNIA REPAIR Right 1996     Current Medications:     Current Outpatient Medications:     atorvastatin (LIPITOR) 20 MG tablet, Take 1 tablet by mouth daily, Disp: 30 tablet, Rfl: 5    buPROPion (WELLBUTRIN XL) 300 MG extended release tablet, Take 1 tablet by mouth every morning, Disp: 30 tablet, Rfl: 11    vitamin D (CHOLECALCIFEROL) 1000 UNIT TABS tablet, Take 1,000 Units by mouth daily, Disp: , Rfl:     FIBER PO, Take 1 tablet by mouth daily, Disp: , Rfl:     Calcium Carbonate-Vit D-Min (CALCIUM 1200) 7193-0976 MG-UNIT CHEW, Take by mouth, Disp: , Rfl:     Probiotic Product (PROBIOTIC DAILY PO), Take  by mouth., Disp: , Rfl:   Allergies:  Patient has no known allergies. Social History:    reports that she quit smoking about 3 years ago. Her smoking use included cigarettes. She has a 40.00 pack-year smoking history. She has never used smokeless tobacco. She reports current alcohol use of about 6.0 standard drinks per week. She reports that she does not use drugs. Family History:   Family History   Problem Relation Age of Onset    Other Mother         osteoporosis    Cancer Mother         Lung and Liver    Allergies Sister     Cancer Maternal Grandmother         breast cancer     Breast Cancer Maternal Grandmother     Brain Cancer Niece        REVIEW OF SYSTEMS:   Pertinent items are noted in HPI  Review of systems reviewed from Patient History Form dated on August 24, 2022 and available in the patient's chart under the Media tab. CONSTITUTIONAL: Denies unexplained weight loss, fevers, chills or fatigue  NEUROLOGICAL: Denies unsteady gait or progressive weakness  SKIN: Denies skin changes, delayed healing, rash, itching     PHYSICAL EXAMINATION:   Vitals: Height 5' 5\" (1.651 m), weight 138 lb (62.6 kg), last menstrual period 01/01/2014, not currently breastfeeding. GENERAL EXAM:  General Apparence: Patient is adequately groomed with no evidence of malnutrition. Orientation: The patient is oriented to time, place and person. Mood & Affect:The patient's mood and affect are appropriate     PHYSICAL EXAMINATION:  Inspection of the left knee reveals warm, dry, intact skin. There is no adenopathy. The distal neurovascular exam is grossly intact. There is tenderness over the medial border of the patella. There is tenderness over the medial joint line. There is mild tenderness over the quad tendon and proximal pole of the patella. There is no tenderness over the patellar tendon. There is mild tenderness over the lateral joint line. Motion reveals 130 degrees of knee flexion. She has knee extension to -5 degrees.   She has 5 of 5 strength in knee flexion and knee extension. She has mild discomfort with a Erika's test.  She has a negative valgus test.  She has a negative anterior drawer test.    Examination of the contralateral knee reveals warm skin, range of motion within normal limits, good quadriceps bulk, tone and strength, no tenderness to palpation, stable cruciate and collateral ligaments, and no joint line tenderness. X-RAYS: Four views of the left knee are reviewed. There is no periosteal reaction, medullary lesions, or osteoporosis. The medial joint line is mildly diminished. There are multiple loose bodies visualized just superior to the tibia. The patellofemoral joint is diminished. There is an osteophyte noted off the superior pole of the patella. ASSESSMENT:    Left knee-arthritis    PLAN:    Left knee-I had a detailed discussion with the patient. I recommended a short course of oral steroids to help decrease inflammation and discomfort. I also recommended a course of physical therapy and a reevaluation with Lizette Basilio MD in 3 to 4 weeks. She agreed with this plan.         Camila Rai ATC, PASimeonC

## 2022-08-26 ENCOUNTER — HOSPITAL ENCOUNTER (OUTPATIENT)
Dept: PHYSICAL THERAPY | Age: 62
Setting detail: THERAPIES SERIES
Discharge: HOME | End: 2022-08-26
Payer: COMMERCIAL

## 2022-08-26 PROCEDURE — 97161 PT EVAL LOW COMPLEX 20 MIN: CPT

## 2022-08-26 PROCEDURE — 97110 THERAPEUTIC EXERCISES: CPT

## 2022-08-26 PROCEDURE — 97140 MANUAL THERAPY 1/> REGIONS: CPT

## 2022-08-26 NOTE — PLAN OF CARE
[place] [facility] [address] [facility], [facility]  [address] [place] [address], [address] [place] [address]  Phone: [phone]  Fax [phone]     Physical Therapy Certification    Dear  Anne Bangura PA-C,    We had the pleasure of evaluating the following patient for physical therapy services at [address] [place] [address]. A summary of our findings can be found in the initial assessment below. This includes our plan of care. If you have any questions or concerns regarding these findings, please do not hesitate to contact me at the office phone number checked above. Thank you for the referral.       Physician Signature:_______________________________Date:__________________  By signing above (or electronic signature), therapists plan is approved by physician    Patient: Mercy Rogers   : 1960   MRN: [id number]  Referring Physician:   Anne Bangura PA-C    Evaluation Date: 2022      Medical Diagnosis Information:  Diagnosis: M17.12 Localized osteoarthritis of left knee   Treatment Diagnosis: M25.562 Left knee pain                                         Insurance information: PT Insurance Information: Javi Ramirez $60 copay 20PT no auth until after 13 visits (1 eval 12 follow up)     Precautions/ Contra-indications: none  Latex Allergy:  [x]NO      []YES  Preferred Language for Healthcare:   [x]English       []Other:    C-SSRS Triggered by Intake questionnaire (Past 2 wk assessment):   [x] No, Questionnaire did not trigger screening.   [] Yes, Patient intake triggered further evaluation      [] C-SSRS Screening completed  [] PCP notified via Plan of Care  [] Emergency services notified     SUBJECTIVE: Patient stated complaint: Pt reports she is a  and has been on feet on concrete for past 40 years. Recently got worse without direct VERA and now she is having trouble moving/walking at work, not trusting knee with what will happen if she steps wrong. Trouble going up/down stairs. Relevant Medical History:unremarkable   Functional Disability Index: FOTO 56/100   expected 69    Pain Scale: 4-7/10  Easing factors: rest, copper brace  Provocative factors: prolonged activity, bending, squatting, stairs     Type: [x]Constant   []Intermittent  []Radiating []Localized []other:     Numbness/Tingling: denies    Occupation/School:      Living Status/Prior Level of Function: Independent with ADLs and IADLs, active    OBJECTIVE:     ROM LEFT RIGHT   Knee ext 0 P! Knee Flex 155 P! Strength  LEFT RIGHT   HIP Abductors 4    HIP Ext 4    Knee EXT (quad) 4-           Reflexes/Sensation: NT   []Dermatomes/Myotomes intact    []Reflexes equal and normal bilaterally   []Other:    Joint mobility:    []Normal    [x]Hypo - patellofemoral   []Hyper    Palpation: TTP along medial aspect of knee    Functional Mobility/Transfers: independent, anterior shift with squat    Posture: anterior pelvic tilt, genu valgum in single leg stance    Gait: (include devices/WB status) slight antalgic    Orthopedic Special Tests: none                       [x] Patient history, allergies, meds reviewed. Medical chart reviewed. See intake form. Review Of Systems (ROS):  [x]Performed Review of systems (Integumentary, CardioPulmonary, Neurological) by intake and observation. Intake form has been scanned into medical record. Patient has been instructed to contact their primary care physician regarding ROS issues if not already being addressed at this time.       Co-morbidities/Complexities (which will affect course of rehabilitation):   []None           Arthritic conditions   []Rheumatoid arthritis (M05.9)  [x]Osteoarthritis (M19.91)   Cardiovascular conditions   []Hypertension (I10)  []Hyperlipidemia (E78.5)  []Angina pectoris (I20)  []Atherosclerosis (I70)   Musculoskeletal conditions   []Disc pathology   []Congenital spine pathologies   []Prior surgical intervention  []Osteoporosis (M81.8)  []Osteopenia (M85.8)   Endocrine conditions   []Hypothyroid (E03.9)  []Hyperthyroid Gastrointestinal conditions   []Constipation (Y60.92)   Metabolic conditions   []Morbid obesity (E66.01)  []Diabetes type 1(E10.65) or 2 (E11.65)   []Neuropathy (G60.9)     Pulmonary conditions   []Asthma (J45)  []Coughing   []COPD (J44.9)   Psychological Disorders  [x]Anxiety (F41.9)  []Depression (F32.9)   []Other:   []Other:          Barriers to/and or personal factors that will affect rehab potential:              [x]Age  [x]Sex              [x]Motivation/Lack of Motivation                        []Co-Morbidities              []Cognitive Function, education/learning barriers              []Environmental, home barriers              [x]profession/work barriers  []past PT/medical experience  []other:  Justification    ASSESSMENT:   Functional Impairments:     [x]Noted lumbar/proximal hip/LE joint hypomobility   [x]Decreased LE functional ROM   [x]Decreased core/proximal hip strength and neuromuscular control   [x]Decreased LE functional strength   [x]Reduced balance/proprioceptive control   []other:      Functional Activity Limitations (from functional questionnaire and intake)   [x]Reduced ability to tolerate prolonged functional positions   [x]Reduced ability or difficulty with changes of positions or transfers between positions   [x]Reduced ability to maintain good posture and demonstrate good body mechanics with sitting, bending, and lifting   [x]Reduced ability to sleep   [x] Reduced ability or tolerance with driving and/or computer work   [x]Reduced ability to perform lifting, carrying tasks   [x]Reduced ability to squat   [x]Reduced ability to forward bend   [x]Reduced ability to ambulate prolonged functional periods/distances/surfaces   [x]Reduced ability to ascend/descend stairs   [x]Reduced ability to run, hop, cut or jump   []other:    Participation Restrictions   [x]Reduced participation in self care assessment of functional outcome. [x] EVAL (LOW) 27410 (typically 20 minutes face-to-face)  [] EVAL (MOD) 63327 (typically 30 minutes face-to-face)  [] EVAL (HIGH) 21157 (typically 45 minutes face-to-face)  [] RE-EVAL     PLAN:   Frequency/Duration:  1-2 days per week for 12 Weeks:  Interventions:  [x]  Therapeutic exercise including: strength training, ROM, for Lower extremity and core   [x]  NMR activation and proprioception for LE, Glutes and Core   [x]  Manual therapy as indicated for LE, Hip and spine to include: Dry Needling/IASTM, STM, PROM, Gr I-IV mobilizations, manipulation. [x] Modalities as needed that may include: thermal agents, E-stim, Biofeedback, US, iontophoresis as indicated  [x] Patient education on joint protection, postural re-education, activity modification, progression of HEP. GOALS:  Patient stated goal: Pain free work  [] Progressing: [] Met: [] Not Met: [] Adjusted    Therapist goals for Patient:   Short Term Goals: To be achieved in: 2 weeks  1. Independent in HEP and progression per patient tolerance, in order to prevent re-injury. [] Progressing: [] Met: [] Not Met: [] Adjusted  2. Patient will have a decrease in pain to facilitate improvement in movement, function, and ADLs as indicated by Functional Deficits. [] Progressing: [] Met: [] Not Met: [] Adjusted    Long Term Goals: To be achieved in: 12 weeks  1. Disability index score of 67 or greater on FOTO outcome measure to assist with reaching prior level of function. [] Progressing: [] Met: [] Not Met: [] Adjusted  2. Patient will demonstrate increased AROM to 0-150 pain free to allow for proper joint functioning as indicated by patients Functional Deficits. [] Progressing: [] Met: [] Not Met: [] Adjusted  3. Patient will demonstrate an increase in Strength to good proximal hip strength and control, at least 4+/5 throughout LE to allow for proper functional mobility as indicated by patients Functional Deficits.  [] Progressing: [] Met: [] Not Met: [] Adjusted  4. Patient will return to laundry and house chores without increased symptoms or restriction. [] Progressing: [] Met: [] Not Met: [] Adjusted  5. Patient will be able to ascend/descend stairs with reciprocal pattern for return to normal ADLs.    [] Progressing: [] Met: [] Not Met: [] Adjusted      Electronically signed by:  Pedro Burkett DPT, [id number]

## 2022-08-26 NOTE — FLOWSHEET NOTE
[facility],  [address] [place]  Phone: [phone]  Fax [phone]    Physical Therapy Daily Treatment Note  Date:  2022    Patient Name:  Rodolfo Blas    :  1960  MRN: [id number]  Restrictions/Precautions:    Physician Information:   Delaney Neri PA-C  Medical/Treatment Diagnosis Information:  Diagnosis: M17.12 Localized osteoarthritis of left knee  Treatment Diagnosis: M25.562 Left knee pain   [x] Conservative / [] Surgical - DOS:  Therapy Diagnosis/Practice Pattern:  Practice Pattern A: Primary Prevention  Insurance/Certification information:  PT Insurance Information: Ashlee Patel $60 copay 20PT no auth until after 13 visits (1 eval 12 follow up)  Plan of care signed: [] YES  [x] NO  Number of Comorbidities:  []0     [x]1-2    []3+  Date of Patient follow up with Physician:     Is this a Progress Report:     []  Yes  [x]  No        If Yes:  Date Range for reporting period:  Beginning 2022   Ending     Progress report will be due (10 Rx or 30 days whichever is less): 1683       Recertification will be due (POC Duration  / 90 days whichever is less): 2022      Latex Allergy:  [x]NO      []YES  Preferred Language for Healthcare:   [x]English       []other:    Visit # Insurance Allowable   1 20 auth after 13     SUBJECTIVE:  See eval    OBJECTIVE: See eval  Observation:  Palpation:    Test used Initial score Current Score   VAS     FOTO     flexion     extension     quad     ABD     flexion          RESTRICTIONS/PRECAUTIONS: none    Exercises/Interventions:     Therapeutic Ex/NMR  Sets/reps Notes   Supine HS floss 3 x 30\"    HL add iso 10 x 10\"    SLR 2 x 10    Clamshells 2 x 10    Bridge with band 2 x 10    LAQ iso 10 x 10\"                                            Pt ed anatomy, surgery, PT progression, prognosis 12 min    Manual Intervention     STM global knee musculature, patellar mobs 8 min                     Access Code: [email or web address]  URL: [email or web address]  Date: 08/26/2022  Prepared by: Lisbet Fierro    Exercises  Hamstring floss - 1-2 x daily - 7 x weekly - 15 reps  Supine Hip Adduction Isometric with Ball - 1-2 x daily - 7 x weekly - 15 reps  Active Straight Leg Raise with Quad Set - 1-2 x daily - 7 x weekly - 2 sets - 10 reps  Clamshell with Resistance - 1-2 x daily - 7 x weekly - 2 sets - 10 reps - 2 hold  Supine Bridge with Resistance Band - 1-2 x daily - 7 x weekly - 2 sets - 10 reps  Seated Isometric Knee Extension - 1-2 x daily - 7 x weekly - 10 reps - 5 hold      Therapeutic Exercise and NMR EXR  [x] (36567) Provided verbal/tactile cueing for activities related to strengthening, flexibility, endurance, ROM for improvements in LE, proximal hip, and core control with self care, mobility, lifting, ambulation.  [] (07628) Provided verbal/tactile cueing for activities related to improving balance, coordination, kinesthetic sense, posture, motor skill, proprioception  to assist with LE, proximal hip, and core control in self care, mobility, lifting, ambulation and eccentric single leg control.      NMR and Therapeutic Activities:    [x] (55078 or 67996) Provided verbal/tactile cueing for activities related to improving balance, coordination, kinesthetic sense, posture, motor skill, proprioception and motor activation to allow for proper function of core, proximal hip and LE with self care and ADLs  [] (56805) Gait Re-education- Provided training and instruction to the patient for proper LE, core and proximal hip recruitment and positioning and eccentric body weight control with ambulation re-education including up and down stairs     Home Exercise Program:    [x] (39969) Reviewed/Progressed HEP activities related to strengthening, flexibility, endurance, ROM of core, proximal hip and LE for functional self-care, mobility, lifting and ambulation/stair navigation   [] (33840)Reviewed/Progressed HEP activities related to improving balance, coordination, kinesthetic sense, posture, motor skill, proprioception of core, proximal hip and LE for self care, mobility, lifting, and ambulation/stair navigation      Manual Treatments:  PROM / STM / Oscillations-Mobs:  G-I, II, III, IV (PA's, Inf., Post.)  [x] (55945) Provided manual therapy to mobilize LE, proximal hip and/or LS spine soft tissue/joints for the purpose of modulating pain, promoting relaxation,  increasing ROM, reducing/eliminating soft tissue swelling/inflammation/restriction, improving soft tissue extensibility and allowing for proper ROM for normal function with self care, mobility, lifting and ambulation. Modalities:       [] GR/ESU 15 min    [] GR 15 min  [] ESU     [] CP    [] MHP    [x] declined     Charges:  Timed Code Treatment Minutes: 40   Total Treatment Minutes: 50     [x] EVAL (LOW) 18145 (typically 20 minutes face-to-face)  [] EVAL (MOD) 91487 (typically 30 minutes face-to-face)  [] EVAL (HIGH) 63879 (typically 45 minutes face-to-face)  [] RE-EVAL     [x] AC(64362) x  2   [] IONTO  [] NMR (78990) x     [] VASO  [x] Manual (83432) x  1    [] Other:  [] TA x      [] Mech Traction (01424)  [] ES(attended) (15785)      [] ES (un) (24563):     GOALS: Patient stated goal: Pain free work  [] Progressing: [] Met: [] Not Met: [] Adjusted    Therapist goals for Patient:   Short Term Goals: To be achieved in: 2 weeks  1. Independent in HEP and progression per patient tolerance, in order to prevent re-injury. [] Progressing: [] Met: [] Not Met: [] Adjusted  2. Patient will have a decrease in pain to facilitate improvement in movement, function, and ADLs as indicated by Functional Deficits. [] Progressing: [] Met: [] Not Met: [] Adjusted    Long Term Goals: To be achieved in: 12 weeks  1. Disability index score of 67 or greater on FOTO outcome measure to assist with reaching prior level of function.   [] Progressing: a discharge from care along with most recent update on progress.

## 2022-09-07 ENCOUNTER — HOSPITAL ENCOUNTER (OUTPATIENT)
Dept: PHYSICAL THERAPY | Age: 62
Setting detail: THERAPIES SERIES
Discharge: HOME | End: 2022-09-07
Payer: COMMERCIAL

## 2022-09-07 PROCEDURE — 97140 MANUAL THERAPY 1/> REGIONS: CPT

## 2022-09-07 PROCEDURE — 97110 THERAPEUTIC EXERCISES: CPT

## 2022-09-07 NOTE — FLOWSHEET NOTE
[facility],  [address] [place]  Phone: [phone]  Fax [phone]    Physical Therapy Daily Treatment Note  Date:  2022    Patient Name:  Zac Bacon    :  1960  MRN: [id number]  Restrictions/Precautions:    Physician Information:   Giovana Hampton PA-C  Medical/Treatment Diagnosis Information:  Diagnosis: M17.12 Localized osteoarthritis of left knee  Treatment Diagnosis: M25.562 Left knee pain   [x] Conservative / [] Surgical - DOS:  Therapy Diagnosis/Practice Pattern:  Practice Pattern A: Primary Prevention  Insurance/Certification information:  PT Insurance Information: Moira [employer] $60 copay 20PT no auth until after 13 visits (1 eval 12 follow up)  Plan of care signed: [] YES  [x] NO  Number of Comorbidities:  []0     [x]1-2    []3+  Date of Patient follow up with Physician:     Is this a Progress Report:     []  Yes  [x]  No        If Yes:  Date Range for reporting period:  Beginning 2022   Ending     Progress report will be due (10 Rx or 30 days whichever is less):        Recertification will be due (POC Duration  / 90 days whichever is less): 2022      Latex Allergy:  [x]NO      []YES  Preferred Language for Healthcare:   [x]English       []other:    Visit # Insurance Allowable   2 20 auth after 13     SUBJECTIVE:  Pt reports started feeling pretty good with steroid taper and having two days in a row off work. Then went back to work and it started bothering her more again.     OBJECTIVE:   Observation:  Palpation:    Test used Initial score Current Score   VAS     FOTO     flexion     extension     quad     ABD     flexion          RESTRICTIONS/PRECAUTIONS: none    Exercises/Interventions:     Therapeutic Ex/NMR  Sets/reps Notes   Supine HS floss 15 x    HL add iso 10 x 10\"    SLR 3 x 10 1#   Clamshells 3 x 10 OVL   Bridge with ball 2 x 10    LAQ iso 10 x 10\"    [place] 15 x OVL   FSU/LSU 15 x 6\"                                 Pt ed anatomy, surgery, PT progression, prognosis 12 min    Manual Intervention     STM global knee musculature, patellar mobs 8 min                     Access Code: [email or web address]  URL: [email or web address]  Date: 08/26/2022  Prepared by: Diamante Bustos    Exercises  Hamstring floss - 1-2 x daily - 7 x weekly - 15 reps  Supine Hip Adduction Isometric with Ball - 1-2 x daily - 7 x weekly - 15 reps  Active Straight Leg Raise with Quad Set - 1-2 x daily - 7 x weekly - 2 sets - 10 reps  Clamshell with Resistance - 1-2 x daily - 7 x weekly - 2 sets - 10 reps - 2 hold  Supine Bridge with Resistance Band - 1-2 x daily - 7 x weekly - 2 sets - 10 reps  Seated Isometric Knee Extension - 1-2 x daily - 7 x weekly - 10 reps - 5 hold      Therapeutic Exercise and NMR EXR  [x] (61608) Provided verbal/tactile cueing for activities related to strengthening, flexibility, endurance, ROM for improvements in LE, proximal hip, and core control with self care, mobility, lifting, ambulation.  [] (21021) Provided verbal/tactile cueing for activities related to improving balance, coordination, kinesthetic sense, posture, motor skill, proprioception  to assist with LE, proximal hip, and core control in self care, mobility, lifting, ambulation and eccentric single leg control.      NMR and Therapeutic Activities:    [x] (73977 or 52519) Provided verbal/tactile cueing for activities related to improving balance, coordination, kinesthetic sense, posture, motor skill, proprioception and motor activation to allow for proper function of core, proximal hip and LE with self care and ADLs  [] (45560) Gait Re-education- Provided training and instruction to the patient for proper LE, core and proximal hip recruitment and positioning and eccentric body weight control with ambulation re-education including up and down stairs     Home Exercise Program:    [x] (40271) Reviewed/Progressed HEP activities related to strengthening, flexibility, endurance, ROM of core, proximal hip and LE for functional self-care, mobility, lifting and ambulation/stair navigation   [] (12967)Reviewed/Progressed HEP activities related to improving balance, coordination, kinesthetic sense, posture, motor skill, proprioception of core, proximal hip and LE for self care, mobility, lifting, and ambulation/stair navigation      Manual Treatments:  PROM / STM / Oscillations-Mobs:  G-I, II, III, IV (PA's, Inf., Post.)  [x] (60515) Provided manual therapy to mobilize LE, proximal hip and/or LS spine soft tissue/joints for the purpose of modulating pain, promoting relaxation,  increasing ROM, reducing/eliminating soft tissue swelling/inflammation/restriction, improving soft tissue extensibility and allowing for proper ROM for normal function with self care, mobility, lifting and ambulation. Modalities:       [] GR/ESU 15 min    [] GR 15 min  [] ESU     [] CP    [] MHP    [x] declined     Charges:  Timed Code Treatment Minutes: 40   Total Treatment Minutes: 40     [] EVAL (LOW) 55825 (typically 20 minutes face-to-face)  [] EVAL (MOD) 80483 (typically 30 minutes face-to-face)  [] EVAL (HIGH) 69979 (typically 45 minutes face-to-face)  [] RE-EVAL     [x] ZT(62870) x  2   [] IONTO  [] NMR (41610) x     [] VASO  [x] Manual (36842) x  1    [] Other:  [] TA x      [] Mech Traction (13046)  [] ES(attended) (48484)      [] ES (un) (04082):     GOALS: Patient stated goal: Pain free work  [] Progressing: [] Met: [] Not Met: [] Adjusted    Therapist goals for Patient:   Short Term Goals: To be achieved in: 2 weeks  1. Independent in HEP and progression per patient tolerance, in order to prevent re-injury. [] Progressing: [] Met: [] Not Met: [] Adjusted  2. Patient will have a decrease in pain to facilitate improvement in movement, function, and ADLs as indicated by Functional Deficits.   [] Progressing: [] Met: [] Not Met: [] Adjusted    Long Term Goals: To be achieved in: 12 weeks  1. Disability index score of 67 or greater on FOTO outcome measure to assist with reaching prior level of function. [] Progressing: [] Met: [] Not Met: [] Adjusted  2. Patient will demonstrate increased AROM to 0-150 pain free to allow for proper joint functioning as indicated by patients Functional Deficits. [] Progressing: [] Met: [] Not Met: [] Adjusted  3. Patient will demonstrate an increase in Strength to good proximal hip strength and control, at least 4+/5 throughout LE to allow for proper functional mobility as indicated by patients Functional Deficits. [] Progressing: [] Met: [] Not Met: [] Adjusted  4. Patient will return to laundry and house chores without increased symptoms or restriction. [] Progressing: [] Met: [] Not Met: [] Adjusted  5. Patient will be able to ascend/descend stairs with reciprocal pattern for return to normal ADLs. [] Progressing: [] Met: [] Not Met: [] Adjusted      Overall Progression Towards Functional goals/ Treatment Progress Update:  [x] Patient is progressing as expected towards functional goals listed. [] Progression is slowed due to complexities/Impairments listed. [] Progression has been slowed due to co-morbidities.   [] Plan just implemented, too soon to assess goals progression <30days   [] Goals require adjustment due to lack of progress  [] Patient is not progressing as expected and requires additional follow up with physician  [] Other    Prognosis for POC: [x] Good [] Fair  [] Poor      Patient requires continued skilled intervention: [x] Yes  [] No      ASSESSMENT:  See eval    Treatment/Activity Tolerance:  [x] Patient tolerated treatment well [] Patient limited by fatigue  [] Patient limited by pain  [] Patient limited by other medical complications  [] Other:       PLAN: See eval  [] Continue per plan of care [] Alter current plan (see comments above)  [x] Plan of care initiated [] Hold pending MD visit [] Discharge      Electronically signed by:  Toshia Peralta, PT , DPT [id number]    Note: If patient does not return for scheduled/ recommended follow up visits, this note will serve as a discharge from care along with most recent update on progress.

## 2022-09-14 ENCOUNTER — OFFICE VISIT (OUTPATIENT)
Dept: INTERNAL MEDICINE CLINIC | Age: 62
End: 2022-09-14
Payer: COMMERCIAL

## 2022-09-14 VITALS
BODY MASS INDEX: 23.79 KG/M2 | DIASTOLIC BLOOD PRESSURE: 60 MMHG | OXYGEN SATURATION: 98 % | HEART RATE: 67 BPM | WEIGHT: 142.8 LBS | SYSTOLIC BLOOD PRESSURE: 102 MMHG | HEIGHT: 65 IN

## 2022-09-14 DIAGNOSIS — Z00.00 ENCOUNTER FOR WELL ADULT EXAM WITHOUT ABNORMAL FINDINGS: Primary | ICD-10-CM

## 2022-09-14 DIAGNOSIS — E78.2 MIXED HYPERLIPIDEMIA: ICD-10-CM

## 2022-09-14 DIAGNOSIS — F41.9 ANXIETY: ICD-10-CM

## 2022-09-14 PROCEDURE — 36415 COLL VENOUS BLD VENIPUNCTURE: CPT | Performed by: INTERNAL MEDICINE

## 2022-09-14 PROCEDURE — 99396 PREV VISIT EST AGE 40-64: CPT | Performed by: INTERNAL MEDICINE

## 2022-09-14 RX ORDER — BUPROPION HYDROCHLORIDE 300 MG/1
300 TABLET ORAL EVERY MORNING
Qty: 30 TABLET | Refills: 11 | Status: SHIPPED | OUTPATIENT
Start: 2022-09-14

## 2022-09-14 NOTE — PATIENT INSTRUCTIONS
Well Visit, Women 48 to 72: Care Instructions  Overview     Well visits can help you stay healthy. Your doctor has checked your overall health and may have suggested ways to take good care of yourself. Your doctor also may have recommended tests. At home, you can help prevent illness withhealthy eating, regular exercise, and other steps. Follow-up care is a key part of your treatment and safety. Be sure to make and go to all appointments, and call your doctor if you are having problems. It's also a good idea to know your test results and keep alist of the medicines you take. How can you care for yourself at home? Get screening tests that you and your doctor decide on. Screening helps find diseases before any symptoms appear. Eat healthy foods. Choose fruits, vegetables, whole grains, protein, and low-fat dairy foods. Limit fat, especially saturated fat. Reduce salt in your diet. Limit alcohol. Have no more than 1 drink a day or 7 drinks a week. Get at least 30 minutes of exercise on most days of the week. Walking is a good choice. You also may want to do other activities, such as running, swimming, cycling, or playing tennis or team sports. Reach and stay at a healthy weight. This will lower your risk for many problems, such as obesity, diabetes, heart disease, and high blood pressure. Do not smoke. Smoking can make health problems worse. If you need help quitting, talk to your doctor about stop-smoking programs and medicines. These can increase your chances of quitting for good. Care for your mental health. It is easy to get weighed down by worry and stress. Learn strategies to manage stress, like deep breathing and mindfulness, and stay connected with your family and community. If you find you often feel sad or hopeless, talk with your doctor. Treatment can help. Talk to your doctor about whether you have any risk factors for sexually transmitted infections (STIs).  You can help prevent STIs if you wait to have sex with a new partner (or partners) until you've each been tested for STIs. It also helps if you use condoms (male or female condoms) and if you limit your sex partners to one person who only has sex with you. Vaccines are available for some STIs. If you think you may have a problem with alcohol or drug use, talk to your doctor. This includes prescription medicines (such as amphetamines and opioids) and illegal drugs (such as cocaine and methamphetamine). Your doctor can help you figure out what type of treatment is best for you. Protect your skin from too much sun. When you're outdoors from 10 a.m. to 4 p.m., stay in the shade or cover up with clothing and a hat with a wide brim. Wear sunglasses that block UV rays. Even when it's cloudy, put broad-spectrum sunscreen (SPF 30 or higher) on any exposed skin. See a dentist one or two times a year for checkups and to have your teeth cleaned. Wear a seat belt in the car. When should you call for help? Watch closely for changes in your health, and be sure to contact your doctor if you have any problems or symptoms that concern you. Where can you learn more? Go to https://[employer]The[employer]ast[employer]utelluseweb.[facility]. org and sign in to your [employer] account. Enter A115 in the [facility] box to learn more about \"Well Visit, Women 50 to 72: Care Instructions. \"     If you do not have an account, please click on the \"Sign Up Now\" link. Current as of: October 6, 2021               Content Version: 13.3  © 2006-2022 [employer]. Care instructions adapted under license by [facility] ([facility]). If you have questions about a medical condition or this instruction, always ask your healthcare professional. [place] [address] any warranty or liability for your use of this information.

## 2022-09-14 NOTE — PROGRESS NOTES
[facility]  History and Physical  Rupesh Martell M.D. Janiya Marquez  YOB: 1960    Date of Service:  9/14/2022    Chief Complaint:   Janiya Marquez is a 64 y.o. female who presents for   Chief Complaint   Patient presents with    Annual Exam     Fasting Labs-Physical       Assessment/Plan:    Oswaldo Ortiz was seen today for annual exam.    Diagnoses and all orders for this visit:    Encounter for well adult exam without abnormal findings  -     Lipid Panel  -     Comprehensive Metabolic Panel  -     CBC with Auto Differential    Anxiety  -     buPROPion (WELLBUTRIN XL) 300 MG extended release tablet; Take 1 tablet by mouth every morning    Mixed hyperlipidemia    Stable and continue on current treatment      Return VV March 7 at 1:10 Cholesterol and Anxiety. HPI: Here for Annual Physical and Follow up. She complain of left lower abdominal pain discomfort for about 3 days so not sure if it's her back, hernia or diverticulitis. Hyperlipidemia:  No new myalgias or GI upset on atorvastatin (Lipitor) 10 mg qd. Medication compliance: compliant most of the time. Patient is  following a low fat, low cholesterol diet. She is  exercising regularly. Anxiety:  Improved on Wellbutrin  mg qd and has not smoke since July 2019.     Lab Results   Component Value Date    LABA1C 5.7 09/15/2021    LABA1C 6.1 09/16/2020    LABA1C 5.8 02/06/2018    LABMICR Yes 12/29/2014     Lab Results   Component Value Date     09/15/2021    K 4.9 09/15/2021     09/15/2021    CO2 23 09/15/2021    BUN 15 09/15/2021    CREATININE <0.5 (L) 09/15/2021    GLUCOSE 110 (H) 09/15/2021    CALCIUM 9.8 09/15/2021     Lab Results   Component Value Date/Time    CHOL 207 09/15/2021 10:53 AM    TRIG 102 09/15/2021 10:53 AM    HDL 79 09/15/2021 10:53 AM    LDLCALC 108 09/15/2021 10:53 AM     Lab Results   Component Value Date    ALT 46 (H) 09/15/2021    AST 35 09/15/2021     No results found for: TSH, T4FREE, T3FREE  Lab Results   Component Value Date    WBC 6.4 09/15/2021    HGB 13.4 09/15/2021    HCT 40.2 09/15/2021    MCV 92.7 09/15/2021     09/15/2021     Lab Results   Component Value Date    INR 1.04 05/19/2014      No results found for: PSA   No results found for: LABURIC     Wt Readings from Last 3 Encounters:   09/14/22 142 lb 12.8 oz (64.8 kg)   08/24/22 138 lb (62.6 kg)   06/08/22 142 lb (64.4 kg)     BP Readings from Last 3 Encounters:   09/14/22 102/60   06/08/22 124/68   01/28/22 131/70       Patient Active Problem List   Diagnosis    Anxiety    Mixed hyperlipidemia    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    Non-recurrent unilateral inguinal hernia without obstruction or gangrene       No Known Allergies  Outpatient Medications Marked as Taking for the 9/14/22 encounter (Office Visit) with Marilyn Cabrera MD   Medication Sig Dispense Refill    atorvastatin (LIPITOR) 20 MG tablet Take 1 tablet by mouth daily 30 tablet 5    buPROPion (WELLBUTRIN XL) 300 MG extended release tablet Take 1 tablet by mouth every morning 30 tablet 11    FIBER PO Take 1 tablet by mouth daily      Calcium Carbonate-Vit D-Min (CALCIUM 1200) 3012-4632 MG-UNIT CHEW Take by mouth      Probiotic Product (PROBIOTIC DAILY PO) Take  by mouth.          Past Medical History:   Diagnosis Date    Anxiety     Diverticulitis     Mixed hyperlipidemia 2/20/2018     Past Surgical History:   Procedure Laterality Date    COLONOSCOPY  4/2/2015    INGUINAL HERNIA REPAIR Right 1996     Family History   Problem Relation Age of Onset    Other Mother         osteoporosis    Cancer Mother         Lung and Liver    Allergies Sister     Cancer Maternal Grandmother         breast cancer     Breast Cancer Maternal Grandmother     Brain Cancer Niece      Social History     Socioeconomic History    Marital status:      Spouse name: Not on file    Number of children: Not on file    Years of education: Not on file Highest education level: Not on file   Occupational History    Not on file   Tobacco Use    Smoking status: Former     Packs/day: 1.00     Years: 40.00     Pack years: 40.00     Types: Cigarettes     Quit date: 7/5/2019     Years since quitting: 3.1    Smokeless tobacco: Never   Substance and Sexual Activity    Alcohol use: Yes     Alcohol/week: 6.0 standard drinks     Types: 6 Cans of beer per week     Comment: socially    Drug use: No    Sexual activity: Yes     Partners: Male   Other Topics Concern    Not on file   Social History Narrative    Not on file     Social Determinants of Health     Financial Resource Strain: Low Risk     Difficulty of Paying Living Expenses: Not hard at all   Food Insecurity: No Food Insecurity    Worried About Running Out of Food in the Last Year: Never true    Ran Out of Food in the Last Year: Never true   Transportation Needs: Not on file   Physical Activity: Not on file   Stress: Not on file   Social Connections: Not on file   Intimate Partner Violence: Not on file   Housing Stability: Not on file       Review of Systems:  A comprehensive review of systems was negative except for what was noted in the HPI. Physical Exam:   Vitals:    09/14/22 1129   BP: 102/60   Pulse: 67   SpO2: 98%   Weight: 142 lb 12.8 oz (64.8 kg)   Height: 5' 5\" (1.651 m)     Body mass index is 23.76 kg/m². Constitutional: She is oriented to person, place, and time. She appears well-developed and well-nourished. No distress. HEENT:   Head: Normocephalic and atraumatic. Right Ear: Tympanic membrane, external ear and ear canal normal.   Left Ear: Tympanic membrane, external ear and ear canal normal.   Mouth/Throat: Oropharynx is clear and moist, and mucous membranes are normal.  There is no cervical adenopathy. Eyes: Conjunctivae and extraocular motions are normal. Pupils are equal, round, and reactive to light. Neck: Supple. No JVD present. Carotid bruit is not present.  No mass and no thyromegaly present. Cardiovascular: Normal rate, regular rhythm, normal heart sounds and intact distal pulses. Pulmonary/Chest: Effort normal and breath sounds normal. No respiratory distress. She has no wheezes, rhonchi or rales. Abdominal: Soft, non-tender. Bowel sounds and aorta are normal. She exhibits no organomegaly, mass or bruit. Musculoskeletal: Normal range of motion, no synovitis. She exhibits no edema. Neurological: She is alert and oriented to person, place, and time. She has normal reflexes. No cranial nerve deficit. Coordination normal.   Skin: Skin is warm and dry. There is no rash or erythema. No suspicious lesions noted.        Preventive Care:  Health Maintenance   Topic Date Due    COVID-19 Vaccine (4 - Booster for Pfizer series) 04/07/2022    Flu vaccine (1) 09/01/2022    A1C test (Diabetic or Prediabetic)  09/15/2022    Lipids  09/15/2022    Low dose CT lung screening  04/13/2023    Depression Screen  06/08/2023    Breast cancer screen  02/23/2024    Colorectal Cancer Screen  10/05/2024    Cervical cancer screen  12/08/2026    DTaP/Tdap/Td vaccine (2 - Td or Tdap) 01/23/2028    Shingles vaccine  Completed    Hepatitis C screen  Addressed    HIV screen  Addressed    Hepatitis A vaccine  Aged Out    Hepatitis B vaccine  Aged Out    Hib vaccine  Aged Out    Meningococcal (ACWY) vaccine  Aged Out    Pneumococcal 0-64 years Vaccine  Aged Out        Recommendations for [employer] Due: see orders and patient instructions/AVS.

## 2022-09-15 LAB
A/G RATIO: 2.6 (ref 1.1–2.2)
ALBUMIN SERPL-MCNC: 4.9 G/DL (ref 3.4–5)
ALP BLD-CCNC: 100 U/L (ref 40–129)
ALT SERPL-CCNC: 42 U/L (ref 10–40)
ANION GAP SERPL CALCULATED.3IONS-SCNC: 11 MMOL/L (ref 3–16)
AST SERPL-CCNC: 26 U/L (ref 15–37)
BASOPHILS ABSOLUTE: 0.1 K/UL (ref 0–0.2)
BASOPHILS RELATIVE PERCENT: 1 %
BILIRUB SERPL-MCNC: 0.3 MG/DL (ref 0–1)
BUN BLDV-MCNC: 10 MG/DL (ref 7–20)
CALCIUM SERPL-MCNC: 9.7 MG/DL (ref 8.3–10.6)
CHLORIDE BLD-SCNC: 104 MMOL/L (ref 99–110)
CHOLESTEROL, TOTAL: 200 MG/DL (ref 0–199)
CO2: 25 MMOL/L (ref 21–32)
CREAT SERPL-MCNC: <0.5 MG/DL (ref 0.6–1.2)
EOSINOPHILS ABSOLUTE: 0.2 K/UL (ref 0–0.6)
EOSINOPHILS RELATIVE PERCENT: 3.1 %
GFR AFRICAN AMERICAN: >60
GFR NON-AFRICAN AMERICAN: >60
GLUCOSE BLD-MCNC: 112 MG/DL (ref 70–99)
HCT VFR BLD CALC: 40.2 % (ref 36–48)
HDLC SERPL-MCNC: 79 MG/DL (ref 40–60)
HEMOGLOBIN: 13.3 G/DL (ref 12–16)
LDL CHOLESTEROL CALCULATED: 110 MG/DL
LYMPHOCYTES ABSOLUTE: 2.2 K/UL (ref 1–5.1)
LYMPHOCYTES RELATIVE PERCENT: 38.5 %
MCH RBC QN AUTO: 30.4 PG (ref 26–34)
MCHC RBC AUTO-ENTMCNC: 33.1 G/DL (ref 31–36)
MCV RBC AUTO: 91.9 FL (ref 80–100)
MONOCYTES ABSOLUTE: 0.3 K/UL (ref 0–1.3)
MONOCYTES RELATIVE PERCENT: 6.1 %
NEUTROPHILS ABSOLUTE: 2.9 K/UL (ref 1.7–7.7)
NEUTROPHILS RELATIVE PERCENT: 51.3 %
PDW BLD-RTO: 14 % (ref 12.4–15.4)
PLATELET # BLD: 261 K/UL (ref 135–450)
PMV BLD AUTO: 8.9 FL (ref 5–10.5)
POTASSIUM SERPL-SCNC: 4.7 MMOL/L (ref 3.5–5.1)
RBC # BLD: 4.38 M/UL (ref 4–5.2)
SODIUM BLD-SCNC: 140 MMOL/L (ref 136–145)
TOTAL PROTEIN: 6.8 G/DL (ref 6.4–8.2)
TRIGL SERPL-MCNC: 54 MG/DL (ref 0–150)
VLDLC SERPL CALC-MCNC: 11 MG/DL
WBC # BLD: 5.6 K/UL (ref 4–11)

## 2022-09-21 ENCOUNTER — APPOINTMENT (OUTPATIENT)
Dept: PHYSICAL THERAPY | Age: 62
End: 2022-09-21
Payer: COMMERCIAL

## 2022-11-26 DIAGNOSIS — E78.2 MIXED HYPERLIPIDEMIA: ICD-10-CM

## 2022-11-28 RX ORDER — ATORVASTATIN CALCIUM 20 MG/1
TABLET, FILM COATED ORAL
Qty: 30 TABLET | Refills: 3 | Status: SHIPPED | OUTPATIENT
Start: 2022-11-28

## 2022-12-13 ENCOUNTER — TELEMEDICINE (OUTPATIENT)
Dept: INTERNAL MEDICINE CLINIC | Age: 62
End: 2022-12-13
Payer: COMMERCIAL

## 2022-12-13 DIAGNOSIS — M54.50 ACUTE BILATERAL LOW BACK PAIN WITHOUT SCIATICA: ICD-10-CM

## 2022-12-13 DIAGNOSIS — R10.32 LEFT GROIN PAIN: Primary | ICD-10-CM

## 2022-12-13 PROCEDURE — 99213 OFFICE O/P EST LOW 20 MIN: CPT | Performed by: INTERNAL MEDICINE

## 2022-12-13 PROCEDURE — 3017F COLORECTAL CA SCREEN DOC REV: CPT | Performed by: INTERNAL MEDICINE

## 2022-12-13 PROCEDURE — G8427 DOCREV CUR MEDS BY ELIG CLIN: HCPCS | Performed by: INTERNAL MEDICINE

## 2022-12-13 PROCEDURE — 81002 URINALYSIS NONAUTO W/O SCOPE: CPT | Performed by: INTERNAL MEDICINE

## 2022-12-13 SDOH — ECONOMIC STABILITY: FOOD INSECURITY: WITHIN THE PAST 12 MONTHS, YOU WORRIED THAT YOUR FOOD WOULD RUN OUT BEFORE YOU GOT MONEY TO BUY MORE.: NEVER TRUE

## 2022-12-13 SDOH — ECONOMIC STABILITY: FOOD INSECURITY: WITHIN THE PAST 12 MONTHS, THE FOOD YOU BOUGHT JUST DIDN'T LAST AND YOU DIDN'T HAVE MONEY TO GET MORE.: NEVER TRUE

## 2022-12-13 ASSESSMENT — SOCIAL DETERMINANTS OF HEALTH (SDOH): HOW HARD IS IT FOR YOU TO PAY FOR THE VERY BASICS LIKE FOOD, HOUSING, MEDICAL CARE, AND HEATING?: NOT VERY HARD

## 2022-12-13 NOTE — PROGRESS NOTES
Pursuant to the emergency declaration under the [facility] waiver authority and the [employer] and [employer] Act, this Virtual  Video Visit was conducted, with patient's consent, to reduce the patient's risk of exposure to COVID-19 and provide continuity of care. Service is  provided through a video synchronous discussion virtually to substitute for in-person clinic visit with the patient being at home and Dr. Anna Marie Laughlin being at home. Patient consent to the video visit. Date of Service:  12/13/2022    Chief Complaint:      Chief Complaint   Patient presents with    Abdominal Pain     Back and Lt side discomfort       Assessment/Plan:    Norma Swift was seen today for abdominal pain. Diagnoses and all orders for this visit:    Left groin pain  -     POCT Urinalysis no Micro    Acute bilateral low back pain without sciatica  -     POCT Urinalysis no Micro      Return pt to stop by for urine dip, exam and possibly a CBC. HPI:  Barbara Farias is a 58 y.o. She complain of pain across her low back and goes down to left groin area for 3 days. She also complains of diarrhea about the same time, no BM today. No dysuria, frequency, urgency, fever or chills. She also started having a sore throat that started today.       Lab Results   Component Value Date    LABA1C 5.7 09/15/2021    LABA1C 6.1 09/16/2020    LABA1C 5.8 02/06/2018    LABMICR Yes 12/29/2014     Lab Results   Component Value Date     09/14/2022    K 4.7 09/14/2022     09/14/2022    CO2 25 09/14/2022    BUN 10 09/14/2022    CREATININE <0.5 (L) 09/14/2022    GLUCOSE 112 (H) 09/14/2022    CALCIUM 9.7 09/14/2022     Lab Results   Component Value Date/Time    CHOL 200 09/14/2022 11:40 AM    TRIG 54 09/14/2022 11:40 AM    HDL 79 09/14/2022 11:40 AM    LDLCALC 110 09/14/2022 11:40 AM     Lab Results   Component Value Date    ALT 42 (H) 09/14/2022    AST 26 09/14/2022     No results found for: TSH, T4FREE, T3FREE  Lab Results   Component Value Date    WBC 5.6 09/14/2022    HGB 13.3 09/14/2022    HCT 40.2 09/14/2022    MCV 91.9 09/14/2022     09/14/2022     Lab Results   Component Value Date    INR 1.04 05/19/2014      No results found for: PSA   No results found for: [facility]     Patient Active Problem List   Diagnosis    Anxiety    Mixed hyperlipidemia    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    Non-recurrent unilateral inguinal hernia without obstruction or gangrene       No Known Allergies  Outpatient Medications Marked as Taking for the 12/13/22 encounter (Telemedicine) with Yesica Cabrera MD   Medication Sig Dispense Refill    atorvastatin (LIPITOR) 20 MG tablet TAKE ONE TABLET BY MOUTH DAILY 30 tablet 3    buPROPion (WELLBUTRIN XL) 300 MG extended release tablet Take 1 tablet by mouth every morning 30 tablet 11    FIBER PO Take 1 tablet by mouth daily      Calcium Carbonate-Vit D-Min (CALCIUM 1200) 5415-4573 MG-UNIT CHEW Take by mouth      Probiotic Product (PROBIOTIC DAILY PO) Take  by mouth. Review of Systems: 14 systems were negative except of what was stated on HPI    Nursing note and vitals reviewed. There were no vitals filed for this visit. Wt Readings from Last 3 Encounters:   09/14/22 142 lb 12.8 oz (64.8 kg)   08/24/22 138 lb (62.6 kg)   06/08/22 142 lb (64.4 kg)     BP Readings from Last 3 Encounters:   09/14/22 102/60   06/08/22 124/68   01/28/22 131/70     There is no height or weight on file to calculate BMI. Constitutional: Patient appears well-developed and well-nourished. No distress. Head: Normocephalic and atraumatic. Psychiatric: Normal mood and affect.  Behavior is normal.

## 2023-02-23 DIAGNOSIS — L30.9 DERMATITIS: Primary | ICD-10-CM

## 2023-03-01 ENCOUNTER — HOSPITAL ENCOUNTER (OUTPATIENT)
Dept: WOMENS IMAGING | Age: 63
Discharge: HOME | End: 2023-03-01
Payer: COMMERCIAL

## 2023-03-01 VITALS — BODY MASS INDEX: 22.99 KG/M2 | HEIGHT: 65 IN | WEIGHT: 138 LBS

## 2023-03-01 DIAGNOSIS — Z12.31 VISIT FOR SCREENING MAMMOGRAM: ICD-10-CM

## 2023-03-01 PROCEDURE — 77067 SCR MAMMO BI INCL CAD: CPT

## 2023-03-06 SDOH — ECONOMIC STABILITY: TRANSPORTATION INSECURITY
IN THE PAST 12 MONTHS, HAS LACK OF TRANSPORTATION KEPT YOU FROM MEETINGS, WORK, OR FROM GETTING THINGS NEEDED FOR DAILY LIVING?: NO

## 2023-03-06 SDOH — ECONOMIC STABILITY: FOOD INSECURITY: WITHIN THE PAST 12 MONTHS, THE FOOD YOU BOUGHT JUST DIDN'T LAST AND YOU DIDN'T HAVE MONEY TO GET MORE.: NEVER TRUE

## 2023-03-06 SDOH — ECONOMIC STABILITY: FOOD INSECURITY: WITHIN THE PAST 12 MONTHS, YOU WORRIED THAT YOUR FOOD WOULD RUN OUT BEFORE YOU GOT MONEY TO BUY MORE.: NEVER TRUE

## 2023-03-06 SDOH — ECONOMIC STABILITY: INCOME INSECURITY: HOW HARD IS IT FOR YOU TO PAY FOR THE VERY BASICS LIKE FOOD, HOUSING, MEDICAL CARE, AND HEATING?: NOT HARD AT ALL

## 2023-03-06 SDOH — ECONOMIC STABILITY: HOUSING INSECURITY
IN THE LAST 12 MONTHS, WAS THERE A TIME WHEN YOU DID NOT HAVE A STEADY PLACE TO SLEEP OR SLEPT IN A SHELTER (INCLUDING NOW)?: NO

## 2023-03-07 ENCOUNTER — TELEMEDICINE (OUTPATIENT)
Dept: INTERNAL MEDICINE CLINIC | Age: 63
End: 2023-03-07
Payer: COMMERCIAL

## 2023-03-07 DIAGNOSIS — R91.8 MULTIPLE SUBSOLID LUNG NODULES LESS THAN 6 MM IN DIAMETER: ICD-10-CM

## 2023-03-07 DIAGNOSIS — Z87.891 HISTORY OF TOBACCO USE: ICD-10-CM

## 2023-03-07 DIAGNOSIS — R21 RASH OF FACE: ICD-10-CM

## 2023-03-07 DIAGNOSIS — E78.2 MIXED HYPERLIPIDEMIA: Primary | ICD-10-CM

## 2023-03-07 DIAGNOSIS — F41.9 ANXIETY: ICD-10-CM

## 2023-03-07 PROCEDURE — 99214 OFFICE O/P EST MOD 30 MIN: CPT | Performed by: INTERNAL MEDICINE

## 2023-03-07 RX ORDER — ATORVASTATIN CALCIUM 20 MG/1
20 TABLET, FILM COATED ORAL DAILY
Qty: 30 TABLET | Refills: 5 | Status: SHIPPED | OUTPATIENT
Start: 2023-03-07

## 2023-03-07 NOTE — PROGRESS NOTES
Pursuant to the emergency declaration under the [facility] waiver authority and the [employer] and [employer] Act, this Virtual  Video Visit was conducted, with patient's consent, to reduce the patient's risk of exposure to COVID-19 and provide continuity of care. Service is  provided through a video synchronous discussion virtually to substitute for in-person clinic visit with the patient being at home and Dr. Phylicia Yen being at home. Patient consent to the video visit. Date of Service:  3/7/2023    Chief Complaint:      Chief Complaint   Patient presents with    Hyperlipidemia    Anxiety    Rash       Assessment/Plan:    Mary Singleton was seen today for hyperlipidemia, anxiety and rash. Diagnoses and all orders for this visit:    Mixed hyperlipidemia  -     atorvastatin (LIPITOR) 20 MG tablet; Take 1 tablet by mouth daily    Anxiety  Stable and continue on current treatment    History of tobacco use  -     CT Lung Screen (Initial or Annual); Future    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  -     CT Lung Screen (Initial or Annual); Future    Rash of face  Avoid new foundation and see if improve  Remove all make up once home from work or avoid it in general if able. Return Fasting PE 9/6. HPI:  Kasi Zuleta is a 58 y.o. She complains of rash in between eyebrows, creases outer nose and chin frou 2 months. It was peeling and has improve in the past week. She did start using a new foundation but have not stop using it yet. No pain or itch and not peeling anymore. Can't get in with a Dermatologist till July. Hyperlipidemia:  No new myalgias or GI upset on atorvastatin (Lipitor) 10 mg qd. Medication compliance: compliant most of the time. Patient is  following a low fat, low cholesterol diet. She is  exercising regularly.    Anxiety:  Improved on Wellbutrin  mg qd and has not smoke since July 2019.  H/O Tob use and pulmonary nodules:  annual CT Lung in March    Lab Results   Component Value Date    LABA1C 5.7 09/15/2021    LABA1C 6.1 09/16/2020    LABA1C 5.8 02/06/2018    LABMICR Yes 12/29/2014     Lab Results   Component Value Date     09/14/2022    K 4.7 09/14/2022     09/14/2022    CO2 25 09/14/2022    BUN 10 09/14/2022    CREATININE <0.5 (L) 09/14/2022    GLUCOSE 112 (H) 09/14/2022    CALCIUM 9.7 09/14/2022     Lab Results   Component Value Date/Time    CHOL 200 09/14/2022 11:40 AM    TRIG 54 09/14/2022 11:40 AM    HDL 79 09/14/2022 11:40 AM    LDLCALC 110 09/14/2022 11:40 AM     Lab Results   Component Value Date    ALT 42 (H) 09/14/2022    AST 26 09/14/2022     No results found for: TSH, T4FREE, T3FREE  Lab Results   Component Value Date    WBC 5.6 09/14/2022    HGB 13.3 09/14/2022    HCT 40.2 09/14/2022    MCV 91.9 09/14/2022     09/14/2022     Lab Results   Component Value Date    INR 1.04 05/19/2014      No results found for: PSA   No results found for: [facility]     Patient Active Problem List   Diagnosis    Anxiety    Mixed hyperlipidemia    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    Non-recurrent unilateral inguinal hernia without obstruction or gangrene       No Known Allergies  No outpatient medications have been marked as taking for the 3/7/23 encounter (Telemedicine) with Amita Dow MD.         Review of Systems: 14 systems were negative except of what was stated on HPI    Nursing note and vitals reviewed. There were no vitals filed for this visit. Wt Readings from Last 3 Encounters:   03/01/23 138 lb (62.6 kg)   09/14/22 142 lb 12.8 oz (64.8 kg)   08/24/22 138 lb (62.6 kg)     BP Readings from Last 3 Encounters:   09/14/22 102/60   06/08/22 124/68   01/28/22 131/70     There is no height or weight on file to calculate BMI. Constitutional: Patient appears well-developed and well-nourished. No distress. Head: Normocephalic and atraumatic. Psychiatric: Normal mood and affect. Behavior is normal.   Increase erythema between eyebrows, bilateral nose crease and chin. She has make up on so difficult to see if other details.

## 2023-04-19 ENCOUNTER — HOSPITAL ENCOUNTER (OUTPATIENT)
Dept: CT IMAGING | Age: 63
Discharge: HOME | End: 2023-04-19
Payer: COMMERCIAL

## 2023-04-19 DIAGNOSIS — Z87.891 HISTORY OF TOBACCO USE: ICD-10-CM

## 2023-04-19 DIAGNOSIS — R91.8 MULTIPLE SUBSOLID LUNG NODULES LESS THAN 6 MM IN DIAMETER: ICD-10-CM

## 2023-04-19 PROCEDURE — 71271 CT THORAX LUNG CANCER SCR C-: CPT

## 2023-05-24 ENCOUNTER — OFFICE VISIT (OUTPATIENT)
Dept: INTERNAL MEDICINE CLINIC | Age: 63
End: 2023-05-24
Payer: COMMERCIAL

## 2023-05-24 VITALS
BODY MASS INDEX: 23.66 KG/M2 | DIASTOLIC BLOOD PRESSURE: 78 MMHG | HEART RATE: 77 BPM | SYSTOLIC BLOOD PRESSURE: 138 MMHG | WEIGHT: 142 LBS | OXYGEN SATURATION: 99 % | HEIGHT: 65 IN

## 2023-05-24 DIAGNOSIS — L20.9 ATOPIC DERMATITIS OF FACE: Primary | ICD-10-CM

## 2023-05-24 PROCEDURE — 99213 OFFICE O/P EST LOW 20 MIN: CPT | Performed by: INTERNAL MEDICINE

## 2023-05-24 RX ORDER — CRISABOROLE 20 MG/G
OINTMENT TOPICAL
Qty: 60 G | Refills: 0 | Status: SHIPPED | OUTPATIENT
Start: 2023-05-24

## 2023-05-24 ASSESSMENT — PATIENT HEALTH QUESTIONNAIRE - PHQ9
SUM OF ALL RESPONSES TO PHQ QUESTIONS 1-9: 0
1. LITTLE INTEREST OR PLEASURE IN DOING THINGS: 0
SUM OF ALL RESPONSES TO PHQ QUESTIONS 1-9: 0
SUM OF ALL RESPONSES TO PHQ QUESTIONS 1-9: 0
SUM OF ALL RESPONSES TO PHQ9 QUESTIONS 1 & 2: 0
SUM OF ALL RESPONSES TO PHQ QUESTIONS 1-9: 0
2. FEELING DOWN, DEPRESSED OR HOPELESS: 0

## 2023-05-24 NOTE — PATIENT INSTRUCTIONS
[facility]:  [place] [id number] [phone]  [facility] (8 locations):  [phone] dermatology [phone] [address] [facility] D[facility]ology [phone]   Dermatology ([facility])  [phone] [employer] Collet and Medicaid

## 2023-05-24 NOTE — PROGRESS NOTES
lung nodules less than 6 mm in diameter    Non-recurrent unilateral inguinal hernia without obstruction or gangrene       No Known Allergies  Outpatient Medications Marked as Taking for the 5/24/23 encounter (Office Visit) with Alvaro Ramirez MD   Medication Sig Dispense Refill    atorvastatin (LIPITOR) 20 MG tablet Take 1 tablet by mouth daily 30 tablet 5    buPROPion (WELLBUTRIN XL) 300 MG extended release tablet Take 1 tablet by mouth every morning 30 tablet 11    FIBER PO Take 1 tablet by mouth daily      Calcium Carbonate-Vit D-Min (CALCIUM 1200) 5372-0938 MG-UNIT CHEW Take by mouth      Probiotic Product (PROBIOTIC DAILY PO) Take  by mouth. Review of Systems: 14 systems were negative except of what was stated on HPI    Nursing note and vitals reviewed. Vitals:    05/24/23 1440   BP: 138/78   Pulse: 77   SpO2: 99%   Weight: 142 lb (64.4 kg)   Height: 5' 5\" (1.651 m)     Wt Readings from Last 3 Encounters:   05/24/23 142 lb (64.4 kg)   03/01/23 138 lb (62.6 kg)   09/14/22 142 lb 12.8 oz (64.8 kg)     BP Readings from Last 3 Encounters:   05/24/23 138/78   09/14/22 102/60   06/08/22 124/68     Body mass index is 23.63 kg/m². Constitutional: Patient appears well-developed and well-nourished. No distress. Head: Normocephalic and atraumatic. Neck: Normal range of motion. Neck supple. No thyroidmegaly. Cardiovascular: Normal rate, regular rhythm, normal heart sounds and intact distal pulses. Pulmonary/Chest: Effort normal and breath sounds normal. No stridor. No respiratory distress. No wheezes and no rales. Abdominal: Soft. Bowel sounds are normal. No distension and no mass. No tenderness. No rebound and no guarding. Musculoskeletal: No edema and no tenderness. Skin: No rash or erythema.

## 2023-05-25 ENCOUNTER — TELEPHONE (OUTPATIENT)
Dept: ADMINISTRATIVE | Age: 63
End: 2023-05-25

## 2023-05-25 RX ORDER — TACROLIMUS 1 MG/G
OINTMENT TOPICAL
Qty: 30 G | Refills: 0 | Status: SHIPPED | OUTPATIENT
Start: 2023-05-25

## 2023-05-25 NOTE — TELEPHONE ENCOUNTER
Submitted PA for Eucrisa 2% ointment    [facility] Key: [id number] - PA Case ID: [id number] - Rx #: [id number]   STATUS: Deniedtoday  Denied. Unable to approve the medication (EUCRISA Ointment 2%) due to unmet criteria (4) as outlined in the plan guideline below. Member must try and fail the following drug alternatives: (tacrolimus ointment). If you are looking for additional alternatives, please refer to the plan's preferred drug list. This list can be found on the health plan's website. Unless otherwise stated please use generics when available. The plan guideline (CP.PMN.110 ([place] (Eucrisa)) requires all of the following criteria to be met prior to consideration of approval: Atopic Dermatitis (must meet all): 1. Diagnosis of atopic dermatitis; 2. Age at least 3 months; 3. Failure of a 2-week trial of two generic medium-to-very high potency topical corticosteroids, unless contraindicated (e.g., areas involving the face, neck or intertriginous areas) or clinically significant adverse effects are experienced; 4. Failure of a 2-week trial of topical tacrolimus*, unless contraindicated or clinically significant adverse effects are experienced; 5. Dose does not exceed 60 grams (1 tube) per 30 days. *Prior authorization may be required for topical tacrolimus. Note: If you believe the member has met criteria (4) as described above, please resubmit your request with additional clinically supportive documentation for consideration. Follow up done daily; if no response in three days we will refax for status check. If another three days goes by with no response we will call the insurance for status.

## 2023-08-10 ENCOUNTER — PATIENT MESSAGE (OUTPATIENT)
Dept: INTERNAL MEDICINE CLINIC | Age: 63
End: 2023-08-10

## 2023-08-10 DIAGNOSIS — L20.9 ATOPIC DERMATITIS OF FACE: Primary | ICD-10-CM

## 2023-08-10 NOTE — TELEPHONE ENCOUNTER
From: Sunday Montero  To: Dr. Hiram Schroeder: 8/10/2023 11:48 AM EDT  Subject: Dermatologist     You gave me a list of dermatologists in May the only one, that will take my insurance is  Dermatology, they will not give me a appointment unless they receive a referral from you. Could you please fax them a referral the number is [phone].  Thank you

## 2023-09-06 ENCOUNTER — OFFICE VISIT (OUTPATIENT)
Dept: INTERNAL MEDICINE CLINIC | Age: 63
End: 2023-09-06
Payer: COMMERCIAL

## 2023-09-06 VITALS
DIASTOLIC BLOOD PRESSURE: 78 MMHG | SYSTOLIC BLOOD PRESSURE: 126 MMHG | HEIGHT: 65 IN | OXYGEN SATURATION: 99 % | WEIGHT: 133 LBS | BODY MASS INDEX: 22.16 KG/M2 | HEART RATE: 58 BPM

## 2023-09-06 DIAGNOSIS — Z00.00 ENCOUNTER FOR WELL ADULT EXAM WITHOUT ABNORMAL FINDINGS: Primary | ICD-10-CM

## 2023-09-06 DIAGNOSIS — F41.9 ANXIETY: ICD-10-CM

## 2023-09-06 DIAGNOSIS — E78.1 HYPERTRIGLYCERIDEMIA: ICD-10-CM

## 2023-09-06 DIAGNOSIS — E78.2 MIXED HYPERLIPIDEMIA: ICD-10-CM

## 2023-09-06 PROCEDURE — 36415 COLL VENOUS BLD VENIPUNCTURE: CPT | Performed by: INTERNAL MEDICINE

## 2023-09-06 PROCEDURE — 99396 PREV VISIT EST AGE 40-64: CPT | Performed by: INTERNAL MEDICINE

## 2023-09-06 RX ORDER — ATORVASTATIN CALCIUM 20 MG/1
20 TABLET, FILM COATED ORAL DAILY
Qty: 30 TABLET | Refills: 11 | Status: SHIPPED | OUTPATIENT
Start: 2023-09-06

## 2023-09-06 RX ORDER — BUPROPION HYDROCHLORIDE 300 MG/1
300 TABLET ORAL EVERY MORNING
Qty: 30 TABLET | Refills: 5 | Status: SHIPPED | OUTPATIENT
Start: 2023-09-06

## 2023-09-06 NOTE — PATIENT INSTRUCTIONS
Well Visit 50 to 72: Care Instructions  Overview     Well visits can help you stay healthy. Your doctor has checked your overall health and may have suggested ways to take good care of yourself. Your doctor also may have recommended tests. At home, you can help prevent illness with healthy eating, regular exercise, and other steps. Follow-up care is a key part of your treatment and safety. Be sure to make and go to all appointments, and call your doctor if you are having problems. It's also a good idea to know your test results and keep a list of the medicines you take. How can you care for yourself at home? Get screening tests that you and your doctor decide on. Screening helps find diseases before any symptoms appear. Eat healthy foods. Choose fruits, vegetables, whole grains, protein, and low-fat dairy foods. Limit fat, especially saturated fat. Reduce salt in your diet. Limit alcohol. Have no more than 2 drinks a day or 14 drinks a week. Get at least 30 minutes of exercise on most days of the week. Walking is a good choice. You also may want to do other activities, such as running, swimming, cycling, or playing tennis or team sports. Reach and stay at a healthy weight. This will lower your risk for many problems, such as obesity, diabetes, heart disease, and high blood pressure. Do not smoke. Smoking can make health problems worse. If you need help quitting, talk to your doctor about stop-smoking programs and medicines. These can increase your chances of quitting for good. Care for your mental health. It is easy to get weighed down by worry and stress. Learn strategies to manage stress, like deep breathing and mindfulness, and stay connected with your family and community. If you find you often feel sad or hopeless, talk with your doctor. Treatment can help. Talk to your doctor about whether you have any risk factors for sexually transmitted infections (STIs).  You can help prevent STIs if you wait

## 2023-09-06 NOTE — PROGRESS NOTES
[facility]  History and Physical  Marcelino Garrido M.D. Sunday Montero  YOB: 1960    Date of Service:  9/6/2023    Chief Complaint:   Sunday Montero is a 58 y.o. female who presents for   Chief Complaint   Patient presents with    Annual Exam       Assessment/Plan:    Lincoln Peters was seen today for annual exam.    Diagnoses and all orders for this visit:    Encounter for well adult exam without abnormal findings  -     Lipid Panel  -     Comprehensive Metabolic Panel  -     CBC with Auto Differential  -     Hemoglobin A1C    Mixed hyperlipidemia  -     atorvastatin (LIPITOR) 20 MG tablet; Take 1 tablet by mouth daily    Anxiety  -     buPROPion (WELLBUTRIN XL) 300 MG extended release tablet; Take 1 tablet by mouth every morning    Hypertriglyceridemia  -     Hemoglobin A1C      Return VV Anxiety and cholesterol 2/27. HPI: Here for Annual Physical and Follow up. Hyperlipidemia:  No new myalgias or GI upset on atorvastatin (Lipitor) 10 mg qd. Medication compliance: compliant most of the time. Patient is  following a low fat, low cholesterol diet. She is  exercising regularly. Anxiety:  Improved on Wellbutrin  mg qd and has not smoke since July 2019.   H/O Tob use and pulmonary nodules:  annual CT Lung in March    Lab Results   Component Value Date    LABA1C 5.7 09/15/2021    LABA1C 6.1 09/16/2020    LABA1C 5.8 02/06/2018     Lab Results   Component Value Date     09/14/2022    K 4.7 09/14/2022     09/14/2022    CO2 25 09/14/2022    BUN 10 09/14/2022    CREATININE <0.5 (L) 09/14/2022    GLUCOSE 112 (H) 09/14/2022    CALCIUM 9.7 09/14/2022     Lab Results   Component Value Date/Time    CHOL 200 09/14/2022 11:40 AM    TRIG 54 09/14/2022 11:40 AM    HDL 79 09/14/2022 11:40 AM    LDLCALC 110 09/14/2022 11:40 AM     Lab Results   Component Value Date    ALT 42 (H) 09/14/2022    AST 26 09/14/2022     No results found for: TSH, T4FREE, T3FREE  Lab Results

## 2023-09-07 LAB
ALBUMIN SERPL-MCNC: 4.8 G/DL (ref 3.4–5)
ALBUMIN/GLOB SERPL: 2.3 {RATIO} (ref 1.1–2.2)
ALP SERPL-CCNC: 98 U/L (ref 40–129)
ALT SERPL-CCNC: 41 U/L (ref 10–40)
ANION GAP SERPL CALCULATED.3IONS-SCNC: 11 MMOL/L (ref 3–16)
AST SERPL-CCNC: 26 U/L (ref 15–37)
BASOPHILS # BLD: 0 K/UL (ref 0–0.2)
BASOPHILS NFR BLD: 0.8 %
BILIRUB SERPL-MCNC: 0.3 MG/DL (ref 0–1)
BUN SERPL-MCNC: 10 MG/DL (ref 7–20)
CALCIUM SERPL-MCNC: 9.4 MG/DL (ref 8.3–10.6)
CHLORIDE SERPL-SCNC: 104 MMOL/L (ref 99–110)
CHOLEST SERPL-MCNC: 207 MG/DL (ref 0–199)
CO2 SERPL-SCNC: 26 MMOL/L (ref 21–32)
CREAT SERPL-MCNC: <0.5 MG/DL (ref 0.6–1.2)
DEPRECATED RDW RBC AUTO: 13.9 % (ref 12.4–15.4)
EOSINOPHIL # BLD: 0.3 K/UL (ref 0–0.6)
EOSINOPHIL NFR BLD: 4.6 %
EST. AVERAGE GLUCOSE BLD GHB EST-MCNC: 119.8 MG/DL
GFR SERPLBLD CREATININE-BSD FMLA CKD-EPI: >60 ML/MIN/{1.73_M2}
GLUCOSE SERPL-MCNC: 117 MG/DL (ref 70–99)
HBA1C MFR BLD: 5.8 %
HCT VFR BLD AUTO: 40.6 % (ref 36–48)
HDLC SERPL-MCNC: 85 MG/DL (ref 40–60)
HGB BLD-MCNC: 13.5 G/DL (ref 12–16)
LDLC SERPL CALC-MCNC: 110 MG/DL
LYMPHOCYTES # BLD: 2.8 K/UL (ref 1–5.1)
LYMPHOCYTES NFR BLD: 44.9 %
MCH RBC QN AUTO: 30.4 PG (ref 26–34)
MCHC RBC AUTO-ENTMCNC: 33.2 G/DL (ref 31–36)
MCV RBC AUTO: 91.5 FL (ref 80–100)
MONOCYTES # BLD: 0.3 K/UL (ref 0–1.3)
MONOCYTES NFR BLD: 4.5 %
NEUTROPHILS # BLD: 2.8 K/UL (ref 1.7–7.7)
NEUTROPHILS NFR BLD: 45.2 %
PLATELET # BLD AUTO: 251 K/UL (ref 135–450)
PMV BLD AUTO: 9.3 FL (ref 5–10.5)
POTASSIUM SERPL-SCNC: 4.9 MMOL/L (ref 3.5–5.1)
PROT SERPL-MCNC: 6.9 G/DL (ref 6.4–8.2)
RBC # BLD AUTO: 4.44 M/UL (ref 4–5.2)
SODIUM SERPL-SCNC: 141 MMOL/L (ref 136–145)
TRIGL SERPL-MCNC: 59 MG/DL (ref 0–150)
VLDLC SERPL CALC-MCNC: 12 MG/DL
WBC # BLD AUTO: 6.2 K/UL (ref 4–11)

## 2023-10-04 ENCOUNTER — OFFICE VISIT (OUTPATIENT)
Dept: INTERNAL MEDICINE CLINIC | Age: 63
End: 2023-10-04
Payer: COMMERCIAL

## 2023-10-04 VITALS
HEIGHT: 65 IN | OXYGEN SATURATION: 98 % | DIASTOLIC BLOOD PRESSURE: 64 MMHG | BODY MASS INDEX: 21.66 KG/M2 | HEART RATE: 83 BPM | WEIGHT: 130 LBS | SYSTOLIC BLOOD PRESSURE: 100 MMHG

## 2023-10-04 DIAGNOSIS — H61.21 HEARING LOSS OF RIGHT EAR DUE TO CERUMEN IMPACTION: Primary | ICD-10-CM

## 2023-10-04 DIAGNOSIS — Z23 NEED FOR INFLUENZA VACCINATION: ICD-10-CM

## 2023-10-04 PROCEDURE — 69209 REMOVE IMPACTED EAR WAX UNI: CPT | Performed by: INTERNAL MEDICINE

## 2023-10-04 PROCEDURE — 90471 IMMUNIZATION ADMIN: CPT | Performed by: INTERNAL MEDICINE

## 2023-10-04 PROCEDURE — 99213 OFFICE O/P EST LOW 20 MIN: CPT | Performed by: INTERNAL MEDICINE

## 2023-10-04 PROCEDURE — 90674 CCIIV4 VAC NO PRSV 0.5 ML IM: CPT | Performed by: INTERNAL MEDICINE

## 2023-10-04 NOTE — PROGRESS NOTES
Louella Gilford  YOB: 1960    Date of Service:  10/4/2023    Chief Complaint:      Chief Complaint   Patient presents with    Ear Fullness     Bilateral since Sunday        Assessment/Plan:  Ras Esparza was seen today for ear fullness. Diagnoses and all orders for this visit:    Hearing loss of right ear due to cerumen impaction  -     ND REMOVAL IMPACTED CERUMEN IRRIGATION/LVG UNILAT    Need for influenza vaccination  -     Influenza, FLUCELVAX, (age 10 mo+), IM, Preservative Free, 0.5 mL    Stable and continue on current medications. Return if symptoms worsen or fail to improve. HPI:  Louella Gilford is a 58 y.o. She complain of right ear being clogged 3 days ago and then left ear started feeling clogged yesterday. No pain, cough, runny nose, fever or chills.     Lab Results   Component Value Date    LABA1C 5.8 09/06/2023    LABA1C 5.7 09/15/2021    LABA1C 6.1 09/16/2020     Lab Results   Component Value Date     09/06/2023    K 4.9 09/06/2023     09/06/2023    CO2 26 09/06/2023    BUN 10 09/06/2023    CREATININE <0.5 (L) 09/06/2023    GLUCOSE 117 (H) 09/06/2023    CALCIUM 9.4 09/06/2023     Lab Results   Component Value Date/Time    CHOL 207 09/06/2023 11:54 AM    TRIG 59 09/06/2023 11:54 AM    HDL 85 09/06/2023 11:54 AM    LDLCALC 110 09/06/2023 11:54 AM     Lab Results   Component Value Date    ALT 41 (H) 09/06/2023    AST 26 09/06/2023     No results found for: \"TSH\", \"T4FREE\", \"T3FREE\"  Lab Results   Component Value Date    WBC 6.2 09/06/2023    HGB 13.5 09/06/2023    HCT 40.6 09/06/2023    MCV 91.5 09/06/2023     09/06/2023     Lab Results   Component Value Date    INR 1.04 05/19/2014      No results found for: \"PSA\"   No results found for: \"LABURIC\"     Patient Active Problem List   Diagnosis    Anxiety    Mixed hyperlipidemia    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    Non-recurrent unilateral inguinal hernia without obstruction or

## 2024-03-05 ENCOUNTER — HOSPITAL ENCOUNTER (OUTPATIENT)
Dept: WOMENS IMAGING | Age: 64
Discharge: HOME | End: 2024-03-05
Attending: INTERNAL MEDICINE
Payer: COMMERCIAL

## 2024-03-05 VITALS — BODY MASS INDEX: 21.99 KG/M2 | WEIGHT: 132 LBS | HEIGHT: 65 IN

## 2024-03-05 DIAGNOSIS — Z12.31 ENCOUNTER FOR SCREENING MAMMOGRAM FOR MALIGNANT NEOPLASM OF BREAST: ICD-10-CM

## 2024-03-05 PROCEDURE — 77063 BREAST TOMOSYNTHESIS BI: CPT

## 2024-03-14 DIAGNOSIS — F41.9 ANXIETY: ICD-10-CM

## 2024-03-14 RX ORDER — BUPROPION HYDROCHLORIDE 300 MG/1
300 TABLET ORAL EVERY MORNING
Qty: 30 TABLET | Refills: 5 | OUTPATIENT
Start: 2024-03-14

## 2024-04-16 ENCOUNTER — TELEMEDICINE (OUTPATIENT)
Dept: INTERNAL MEDICINE CLINIC | Age: 64
End: 2024-04-16
Payer: COMMERCIAL

## 2024-04-16 DIAGNOSIS — Z87.891 HISTORY OF TOBACCO USE: ICD-10-CM

## 2024-04-16 DIAGNOSIS — R91.8 MULTIPLE SUBSOLID LUNG NODULES LESS THAN 6 MM IN DIAMETER: ICD-10-CM

## 2024-04-16 DIAGNOSIS — E78.2 MIXED HYPERLIPIDEMIA: ICD-10-CM

## 2024-04-16 DIAGNOSIS — F41.9 ANXIETY: Primary | ICD-10-CM

## 2024-04-16 PROCEDURE — 99213 OFFICE O/P EST LOW 20 MIN: CPT | Performed by: INTERNAL MEDICINE

## 2024-04-16 RX ORDER — BUPROPION HYDROCHLORIDE 300 MG/1
300 TABLET ORAL EVERY MORNING
Qty: 30 TABLET | Refills: 4 | Status: SHIPPED | OUTPATIENT
Start: 2024-04-16

## 2024-04-16 SDOH — ECONOMIC STABILITY: INCOME INSECURITY: HOW HARD IS IT FOR YOU TO PAY FOR THE VERY BASICS LIKE FOOD, HOUSING, MEDICAL CARE, AND HEATING?: NOT HARD AT ALL

## 2024-04-16 SDOH — ECONOMIC STABILITY: FOOD INSECURITY: WITHIN THE PAST 12 MONTHS, YOU WORRIED THAT YOUR FOOD WOULD RUN OUT BEFORE YOU GOT MONEY TO BUY MORE.: NEVER TRUE

## 2024-04-16 SDOH — ECONOMIC STABILITY: FOOD INSECURITY: WITHIN THE PAST 12 MONTHS, THE FOOD YOU BOUGHT JUST DIDN'T LAST AND YOU DIDN'T HAVE MONEY TO GET MORE.: NEVER TRUE

## 2024-04-16 NOTE — PROGRESS NOTES
09/06/2023    CALCIUM 9.4 09/06/2023     Lab Results   Component Value Date/Time    CHOL 207 09/06/2023 11:54 AM    TRIG 59 09/06/2023 11:54 AM    HDL 85 09/06/2023 11:54 AM    LDLCALC 110 09/06/2023 11:54 AM     Lab Results   Component Value Date    ALT 41 (H) 09/06/2023    AST 26 09/06/2023     No results found for: \"TSH\", \"T4FREE\", \"T3FREE\"  Lab Results   Component Value Date    WBC 6.2 09/06/2023    HGB 13.5 09/06/2023    HCT 40.6 09/06/2023    MCV 91.5 09/06/2023     09/06/2023     Lab Results   Component Value Date    INR 1.04 05/19/2014      No results found for: \"PSA\"   No results found for: \"LABURIC\"     Patient Active Problem List   Diagnosis    Anxiety    Mixed hyperlipidemia    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    Non-recurrent unilateral inguinal hernia without obstruction or gangrene       No Known Allergies  Outpatient Medications Marked as Taking for the 4/16/24 encounter (Telemedicine) with Diandra Cabrera MD   Medication Sig Dispense Refill    atorvastatin (LIPITOR) 20 MG tablet Take 1 tablet by mouth daily 30 tablet 11    buPROPion (WELLBUTRIN XL) 300 MG extended release tablet Take 1 tablet by mouth every morning 30 tablet 5    tacrolimus (PROTOPIC) 0.1 % ointment Apply topically 2 times daily. 30 g 0    Crisaborole (EUCRISA) 2 % OINT Apply on affected area on face qd 60 g 0    FIBER PO Take 1 tablet by mouth daily      Calcium Carbonate-Vit D-Min (CALCIUM 1200) 4457-5869 MG-UNIT CHEW Take by mouth      Probiotic Product (PROBIOTIC DAILY PO) Take  by mouth.           Review of Systems: 14 systems were negative except of what was stated on HPI    Nursing note and vitals reviewed.  There were no vitals filed for this visit.  Wt Readings from Last 3 Encounters:   03/05/24 59.9 kg (132 lb)   10/04/23 59 kg (130 lb)   09/06/23 60.3 kg (133 lb)     BP Readings from Last 3 Encounters:   10/04/23 100/64   09/06/23 126/78 05/24/23 138/78     There is no height or weight on

## 2024-06-03 ENCOUNTER — HOSPITAL ENCOUNTER (OUTPATIENT)
Dept: CT IMAGING | Age: 64
Discharge: HOME | End: 2024-06-03
Attending: INTERNAL MEDICINE
Payer: COMMERCIAL

## 2024-06-03 DIAGNOSIS — Z87.891 HISTORY OF TOBACCO USE: ICD-10-CM

## 2024-06-03 DIAGNOSIS — R91.8 MULTIPLE SUBSOLID LUNG NODULES LESS THAN 6 MM IN DIAMETER: ICD-10-CM

## 2024-06-03 PROCEDURE — 71271 CT THORAX LUNG CANCER SCR C-: CPT

## 2024-08-21 ASSESSMENT — PATIENT HEALTH QUESTIONNAIRE - PHQ9
SUM OF ALL RESPONSES TO PHQ QUESTIONS 1-9: 0
1. LITTLE INTEREST OR PLEASURE IN DOING THINGS: NOT AT ALL
SUM OF ALL RESPONSES TO PHQ QUESTIONS 1-9: 0
SUM OF ALL RESPONSES TO PHQ9 QUESTIONS 1 & 2: 0
2. FEELING DOWN, DEPRESSED OR HOPELESS: NOT AT ALL
1. LITTLE INTEREST OR PLEASURE IN DOING THINGS: NOT AT ALL
2. FEELING DOWN, DEPRESSED OR HOPELESS: NOT AT ALL
SUM OF ALL RESPONSES TO PHQ QUESTIONS 1-9: 0
SUM OF ALL RESPONSES TO PHQ9 QUESTIONS 1 & 2: 0
SUM OF ALL RESPONSES TO PHQ QUESTIONS 1-9: 0

## 2024-08-27 ENCOUNTER — OFFICE VISIT (OUTPATIENT)
Dept: INTERNAL MEDICINE CLINIC | Age: 64
End: 2024-08-27
Payer: COMMERCIAL

## 2024-08-27 VITALS
HEART RATE: 72 BPM | SYSTOLIC BLOOD PRESSURE: 118 MMHG | OXYGEN SATURATION: 99 % | BODY MASS INDEX: 22.99 KG/M2 | WEIGHT: 138 LBS | DIASTOLIC BLOOD PRESSURE: 66 MMHG | HEIGHT: 65 IN

## 2024-08-27 DIAGNOSIS — E78.2 MIXED HYPERLIPIDEMIA: ICD-10-CM

## 2024-08-27 DIAGNOSIS — M25.542 ARTHRALGIA OF BOTH HANDS: ICD-10-CM

## 2024-08-27 DIAGNOSIS — F41.9 ANXIETY: ICD-10-CM

## 2024-08-27 DIAGNOSIS — Z00.00 ENCOUNTER FOR WELL ADULT EXAM WITHOUT ABNORMAL FINDINGS: Primary | ICD-10-CM

## 2024-08-27 DIAGNOSIS — Z23 NEED FOR INFLUENZA VACCINATION: ICD-10-CM

## 2024-08-27 DIAGNOSIS — K40.90 NON-RECURRENT UNILATERAL INGUINAL HERNIA WITHOUT OBSTRUCTION OR GANGRENE: ICD-10-CM

## 2024-08-27 DIAGNOSIS — M25.541 ARTHRALGIA OF BOTH HANDS: ICD-10-CM

## 2024-08-27 PROCEDURE — 99396 PREV VISIT EST AGE 40-64: CPT | Performed by: INTERNAL MEDICINE

## 2024-08-27 PROCEDURE — 99213 OFFICE O/P EST LOW 20 MIN: CPT | Performed by: INTERNAL MEDICINE

## 2024-08-27 RX ORDER — DICLOFENAC SODIUM 75 MG/1
75 TABLET, DELAYED RELEASE ORAL 2 TIMES DAILY
Qty: 60 TABLET | Refills: 0 | Status: SHIPPED | OUTPATIENT
Start: 2024-08-27

## 2024-08-27 RX ORDER — ATORVASTATIN CALCIUM 20 MG/1
20 TABLET, FILM COATED ORAL DAILY
Qty: 30 TABLET | Refills: 11 | Status: SHIPPED | OUTPATIENT
Start: 2024-08-27 | End: 2024-08-29 | Stop reason: ALTCHOICE

## 2024-08-27 RX ORDER — BUPROPION HYDROCHLORIDE 300 MG/1
300 TABLET ORAL EVERY MORNING
Qty: 30 TABLET | Refills: 5 | Status: SHIPPED | OUTPATIENT
Start: 2024-08-27

## 2024-08-27 NOTE — PROGRESS NOTES
[facility]  History and Physical  Diandra Cabrera M.D.        Marce Medeiros  YOB: 1960    Date of Service:  8/27/2024    Chief Complaint:   Marce Medeiros is a 63 y.o. female who presents for   Chief Complaint   Patient presents with    Annual Exam     fasting    Anxiety       Assessment/Plan:    Marce Garcia"Josefina" was seen today for annual exam and anxiety.    Diagnoses and all orders for this visit:    Encounter for well adult exam without abnormal findings  -     Lipid Panel  -     Comprehensive Metabolic Panel  -     CBC with Auto Differential    Need for influenza vaccination  -     Cancel: Influenza, FLUCELVAX Trivalent, (age 6 mo+) IM, Preservative Free, 0.5mL    Arthralgia of both hands  -     diclofenac (VOLTAREN) 75 MG EC tablet; Take 1 tablet by mouth 2 times daily  -     WINIFRED  -     CCP Antibodies, IGG/IGA  -     Rheumatoid Factor  -     C-Reactive Protein    Mixed hyperlipidemia  -     Discontinue: atorvastatin (LIPITOR) 20 MG tablet; Take 1 tablet by mouth daily  Start rosuvastatin (CRESTOR) 20 MG tablet; Take 1 tablet by mouth nightly    Anxiety  -     buPROPion (WELLBUTRIN XL) 300 MG extended release tablet; Take 1 tablet by mouth every morning    Non-recurrent unilateral inguinal hernia without obstruction or gangrene    Will see Dr Gerber in January      Return VV Hand arthritis pain 9/26, for VV Hernia, Hand pain, Anxiety, cholesterol 1/9.      HPI: Here for Annual Physical and Follow up.    She complain of bilateral thumb joint pain since she keeps hitting it due to it being some prominent.  Advil doesn't help too much.    Hyperlipidemia:  No new myalgias or GI upset on atorvastatin (Lipitor) 10 mg qd. Medication compliance: compliant most of the time. Patient is  following a low fat, low cholesterol diet.  She is  exercising regularly.   Anxiety:  Improved on Wellbutrin  mg qd and has not smoke since July 2019.  H/O Tob use and pulmonary nodules:  annual CT  Lung in March  Left inguinal hernia:  patient went to see Dr. Gerber but not have time for surgery until January.    Lab Results   Component Value Date    LABA1C 5.8 09/06/2023    LABA1C 5.7 09/15/2021    LABA1C 6.1 09/16/2020     Lab Results   Component Value Date     09/06/2023    K 4.9 09/06/2023     09/06/2023    CO2 26 09/06/2023    BUN 10 09/06/2023    CREATININE <0.5 (L) 09/06/2023    GLUCOSE 117 (H) 09/06/2023    CALCIUM 9.4 09/06/2023     Lab Results   Component Value Date/Time    CHOL 264 08/27/2024 02:52 PM    TRIG 83 08/27/2024 02:52 PM    HDL 90 08/27/2024 02:52 PM     Lab Results   Component Value Date    ALT 41 (H) 09/06/2023    AST 26 09/06/2023     Lab Results   Component Value Date    TSH 1.63 02/06/2018     Lab Results   Component Value Date    WBC 6.2 09/06/2023    HGB 13.5 09/06/2023    HCT 40.6 09/06/2023    MCV 91.5 09/06/2023     09/06/2023     Lab Results   Component Value Date    INR 1.04 05/19/2014      No results found for: \"PSA\"   No results found for: \"LABURIC\"     Wt Readings from Last 3 Encounters:   08/27/24 62.6 kg (138 lb)   03/05/24 59.9 kg (132 lb)   10/04/23 59 kg (130 lb)     BP Readings from Last 3 Encounters:   08/27/24 118/66   10/04/23 100/64   09/06/23 126/78       Patient Active Problem List   Diagnosis    Anxiety    Mixed hyperlipidemia    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    Non-recurrent unilateral inguinal hernia without obstruction or gangrene       No Known Allergies  Outpatient Medications Marked as Taking for the 8/27/24 encounter (Office Visit) with Diandra Cabrera MD   Medication Sig Dispense Refill    buPROPion (WELLBUTRIN XL) 300 MG extended release tablet Take 1 tablet by mouth every morning 30 tablet 4    atorvastatin (LIPITOR) 20 MG tablet Take 1 tablet by mouth daily 30 tablet 11    tacrolimus (PROTOPIC) 0.1 % ointment Apply topically 2 times daily. 30 g 0    Crisaborole (EUCRISA) 2 % OINT Apply on affected area on

## 2024-08-28 LAB
ALBUMIN SERPL-MCNC: 4.7 G/DL (ref 3.4–5)
ALBUMIN/GLOB SERPL: 2.1 {RATIO} (ref 1.1–2.2)
ALP SERPL-CCNC: 95 U/L (ref 40–129)
ALT SERPL-CCNC: 46 U/L (ref 10–40)
ANA SER QL IA: NEGATIVE
ANION GAP SERPL CALCULATED.3IONS-SCNC: 10 MMOL/L (ref 3–16)
AST SERPL-CCNC: 28 U/L (ref 15–37)
BASOPHILS # BLD: 0.1 K/UL (ref 0–0.2)
BASOPHILS NFR BLD: 0.9 %
BILIRUB SERPL-MCNC: 0.4 MG/DL (ref 0–1)
BUN SERPL-MCNC: 10 MG/DL (ref 7–20)
CALCIUM SERPL-MCNC: 9.9 MG/DL (ref 8.3–10.6)
CHLORIDE SERPL-SCNC: 104 MMOL/L (ref 99–110)
CHOLEST SERPL-MCNC: 264 MG/DL (ref 0–199)
CO2 SERPL-SCNC: 25 MMOL/L (ref 21–32)
CREAT SERPL-MCNC: <0.5 MG/DL (ref 0.6–1.2)
CRP SERPL-MCNC: <3 MG/L (ref 0–5.1)
DEPRECATED RDW RBC AUTO: 14.2 % (ref 12.4–15.4)
EOSINOPHIL # BLD: 0.2 K/UL (ref 0–0.6)
EOSINOPHIL NFR BLD: 3.4 %
GFR SERPLBLD CREATININE-BSD FMLA CKD-EPI: >90 ML/MIN/{1.73_M2}
GLUCOSE SERPL-MCNC: 98 MG/DL (ref 70–99)
HCT VFR BLD AUTO: 39.9 % (ref 36–48)
HDLC SERPL-MCNC: 90 MG/DL (ref 40–60)
HGB BLD-MCNC: 13.1 G/DL (ref 12–16)
LDLC SERPL CALC-MCNC: 157 MG/DL
LYMPHOCYTES # BLD: 2.9 K/UL (ref 1–5.1)
LYMPHOCYTES NFR BLD: 46.1 %
MCH RBC QN AUTO: 30.5 PG (ref 26–34)
MCHC RBC AUTO-ENTMCNC: 33 G/DL (ref 31–36)
MCV RBC AUTO: 92.5 FL (ref 80–100)
MONOCYTES # BLD: 0.3 K/UL (ref 0–1.3)
MONOCYTES NFR BLD: 4.7 %
NEUTROPHILS # BLD: 2.9 K/UL (ref 1.7–7.7)
NEUTROPHILS NFR BLD: 44.9 %
PLATELET # BLD AUTO: 283 K/UL (ref 135–450)
PMV BLD AUTO: 9.7 FL (ref 5–10.5)
POTASSIUM SERPL-SCNC: 4.5 MMOL/L (ref 3.5–5.1)
PROT SERPL-MCNC: 6.9 G/DL (ref 6.4–8.2)
RBC # BLD AUTO: 4.31 M/UL (ref 4–5.2)
RHEUMATOID FACT SER IA-ACNC: <10 IU/ML
SODIUM SERPL-SCNC: 139 MMOL/L (ref 136–145)
TRIGL SERPL-MCNC: 83 MG/DL (ref 0–150)
VLDLC SERPL CALC-MCNC: 17 MG/DL
WBC # BLD AUTO: 6.4 K/UL (ref 4–11)

## 2024-08-29 ENCOUNTER — PATIENT MESSAGE (OUTPATIENT)
Dept: INTERNAL MEDICINE CLINIC | Age: 64
End: 2024-08-29

## 2024-08-29 RX ORDER — ROSUVASTATIN CALCIUM 20 MG/1
20 TABLET, COATED ORAL NIGHTLY
Qty: 30 TABLET | Refills: 0 | Status: SHIPPED | OUTPATIENT
Start: 2024-08-29

## 2024-09-03 NOTE — TELEPHONE ENCOUNTER
Good morning Karla,    I hope you had a good holiday, The office has been closed since Thursday and I'm sorry for the late response but yes your cholesterol is 264 from recent labs.    If you have any other questions please feel free to reach out and we would be happy to assist you in whatever you need.    Best wishes,      Dayo Keane CMA.

## 2024-09-17 DIAGNOSIS — E78.2 MIXED HYPERLIPIDEMIA: ICD-10-CM

## 2024-09-17 RX ORDER — ATORVASTATIN CALCIUM 20 MG/1
20 TABLET, FILM COATED ORAL DAILY
Qty: 30 TABLET | Refills: 11 | OUTPATIENT
Start: 2024-09-17

## 2024-09-19 ENCOUNTER — TELEMEDICINE (OUTPATIENT)
Dept: INTERNAL MEDICINE CLINIC | Age: 64
End: 2024-09-19
Payer: COMMERCIAL

## 2024-09-19 DIAGNOSIS — M25.541 ARTHRALGIA OF BOTH HANDS: ICD-10-CM

## 2024-09-19 DIAGNOSIS — M25.542 ARTHRALGIA OF BOTH HANDS: ICD-10-CM

## 2024-09-19 DIAGNOSIS — E78.2 MIXED HYPERLIPIDEMIA: ICD-10-CM

## 2024-09-19 PROCEDURE — 99213 OFFICE O/P EST LOW 20 MIN: CPT | Performed by: INTERNAL MEDICINE

## 2024-09-19 RX ORDER — ROSUVASTATIN CALCIUM 20 MG/1
20 TABLET, COATED ORAL NIGHTLY
Qty: 30 TABLET | Refills: 0 | Status: SHIPPED | OUTPATIENT
Start: 2024-09-19

## 2024-09-19 RX ORDER — DICLOFENAC SODIUM 75 MG/1
75 TABLET, DELAYED RELEASE ORAL 2 TIMES DAILY PRN
Qty: 180 TABLET | Refills: 1 | Status: SHIPPED | OUTPATIENT
Start: 2024-09-19

## 2024-09-26 DIAGNOSIS — E78.2 MIXED HYPERLIPIDEMIA: ICD-10-CM

## 2024-09-26 RX ORDER — ROSUVASTATIN CALCIUM 20 MG/1
20 TABLET, COATED ORAL NIGHTLY
Qty: 30 TABLET | Refills: 0 | OUTPATIENT
Start: 2024-09-26

## 2024-10-01 ENCOUNTER — HOSPITAL ENCOUNTER (OUTPATIENT)
Age: 64
Discharge: HOME | End: 2024-10-01
Payer: COMMERCIAL

## 2024-10-01 DIAGNOSIS — E78.2 MIXED HYPERLIPIDEMIA: ICD-10-CM

## 2024-10-01 LAB
ALBUMIN SERPL-MCNC: 4.5 G/DL (ref 3.4–5)
ALP SERPL-CCNC: 97 U/L (ref 40–129)
ALT SERPL-CCNC: 110 U/L (ref 10–40)
AST SERPL-CCNC: 60 U/L (ref 15–37)
BILIRUB DIRECT SERPL-MCNC: <0.1 MG/DL (ref 0–0.3)
BILIRUB INDIRECT SERPL-MCNC: 0.2 MG/DL (ref 0–1)
BILIRUB SERPL-MCNC: 0.3 MG/DL (ref 0–1)
CHOLEST SERPL-MCNC: 233 MG/DL (ref 0–199)
HDLC SERPL-MCNC: 86 MG/DL (ref 40–60)
LDLC SERPL CALC-MCNC: 132 MG/DL
PROT SERPL-MCNC: 6.9 G/DL (ref 6.4–8.2)
TRIGL SERPL-MCNC: 75 MG/DL (ref 0–150)
VLDLC SERPL CALC-MCNC: 15 MG/DL

## 2024-10-01 PROCEDURE — 36415 COLL VENOUS BLD VENIPUNCTURE: CPT

## 2024-10-01 PROCEDURE — 80061 LIPID PANEL: CPT

## 2024-10-01 PROCEDURE — 80076 HEPATIC FUNCTION PANEL: CPT

## 2025-02-04 DIAGNOSIS — E78.2 MIXED HYPERLIPIDEMIA: ICD-10-CM

## 2025-02-04 RX ORDER — ATORVASTATIN CALCIUM 40 MG/1
40 TABLET, FILM COATED ORAL DAILY
Qty: 30 TABLET | Refills: 3 | OUTPATIENT
Start: 2025-02-04

## 2025-02-11 ENCOUNTER — PATIENT MESSAGE (OUTPATIENT)
Dept: INTERNAL MEDICINE CLINIC | Age: 65
End: 2025-02-11

## 2025-02-27 DIAGNOSIS — E78.2 MIXED HYPERLIPIDEMIA: ICD-10-CM

## 2025-03-03 RX ORDER — ATORVASTATIN CALCIUM 40 MG/1
40 TABLET, FILM COATED ORAL DAILY
Qty: 30 TABLET | Refills: 0 | Status: SHIPPED | OUTPATIENT
Start: 2025-03-03

## 2025-03-03 NOTE — TELEPHONE ENCOUNTER
LAST REFILL 10-8-24  AMOUNT 30     3 REFILLS  LAST VISIT 9-19-24  NEXT VISIT 3-20-25  Currently out of medications

## 2025-03-18 SDOH — ECONOMIC STABILITY: FOOD INSECURITY: WITHIN THE PAST 12 MONTHS, YOU WORRIED THAT YOUR FOOD WOULD RUN OUT BEFORE YOU GOT MONEY TO BUY MORE.: NEVER TRUE

## 2025-03-18 SDOH — ECONOMIC STABILITY: INCOME INSECURITY: IN THE LAST 12 MONTHS, WAS THERE A TIME WHEN YOU WERE NOT ABLE TO PAY THE MORTGAGE OR RENT ON TIME?: NO

## 2025-03-18 SDOH — ECONOMIC STABILITY: FOOD INSECURITY: WITHIN THE PAST 12 MONTHS, THE FOOD YOU BOUGHT JUST DIDN'T LAST AND YOU DIDN'T HAVE MONEY TO GET MORE.: NEVER TRUE

## 2025-03-18 SDOH — ECONOMIC STABILITY: TRANSPORTATION INSECURITY
IN THE PAST 12 MONTHS, HAS THE LACK OF TRANSPORTATION KEPT YOU FROM MEDICAL APPOINTMENTS OR FROM GETTING MEDICATIONS?: NO

## 2025-03-19 DIAGNOSIS — F41.9 ANXIETY: ICD-10-CM

## 2025-03-19 RX ORDER — BUPROPION HYDROCHLORIDE 300 MG/1
300 TABLET ORAL EVERY MORNING
Qty: 30 TABLET | Refills: 5 | OUTPATIENT
Start: 2025-03-19

## 2025-03-20 ENCOUNTER — TELEMEDICINE (OUTPATIENT)
Dept: INTERNAL MEDICINE CLINIC | Age: 65
End: 2025-03-20
Payer: COMMERCIAL

## 2025-03-20 DIAGNOSIS — E78.2 MIXED HYPERLIPIDEMIA: ICD-10-CM

## 2025-03-20 DIAGNOSIS — Z12.11 SCREEN FOR COLON CANCER: ICD-10-CM

## 2025-03-20 DIAGNOSIS — M25.542 ARTHRALGIA OF BOTH HANDS: ICD-10-CM

## 2025-03-20 DIAGNOSIS — M25.541 ARTHRALGIA OF BOTH HANDS: ICD-10-CM

## 2025-03-20 DIAGNOSIS — F41.9 ANXIETY: Primary | ICD-10-CM

## 2025-03-20 PROCEDURE — 99214 OFFICE O/P EST MOD 30 MIN: CPT | Performed by: INTERNAL MEDICINE

## 2025-03-20 RX ORDER — ATORVASTATIN CALCIUM 40 MG/1
40 TABLET, FILM COATED ORAL DAILY
Qty: 30 TABLET | Refills: 4 | Status: SHIPPED | OUTPATIENT
Start: 2025-03-20

## 2025-03-20 RX ORDER — BUPROPION HYDROCHLORIDE 300 MG/1
300 TABLET ORAL EVERY MORNING
Qty: 30 TABLET | Refills: 4 | Status: SHIPPED | OUTPATIENT
Start: 2025-03-20

## 2025-03-20 NOTE — PROGRESS NOTES
Patient was evaluated through a synchronous (real-time) video encounter. Patient identification was verified at the start of the visit. She (or guardian if applicable) is aware that this is a billable service, which includes applicable co-pays. This visit was conducted with the patient's (and/or legal guardian's) verbal consent. She has not had a related appointment within my department in the past 7 days or scheduled within the next 24 hours.   The patient was located at Home: [address] [place] [address].  The provider was located at Home (Appt Dept State): [place].    Date of Service:  3/20/2025    Chief Complaint:      Chief Complaint   Patient presents with    Cholesterol Problem    Anxiety    cologuard    Mass     On head         Assessment/Plan:    Marce Lane" was seen today for cholesterol problem, anxiety, cologuard and mass.    Diagnoses and all orders for this visit:    Anxiety  -     buPROPion (WELLBUTRIN XL) 300 MG extended release tablet; Take 1 tablet by mouth every morning    Mixed hyperlipidemia  -     atorvastatin (LIPITOR) 40 MG tablet; Take 1 tablet by mouth daily    Screen for colon cancer  -     Alexus Massey MD, Gastroenterology, [facility]    Arthralgia of both hands  -     DANI - Arina Dugan MD, Rheumatology, [facility]      Return Fasting PE 8/6.      HPI:  Marce Medeiros is a 64 y.o.    She did have a colonoscopy many years ago and had a polp so she will do another one.    Bilateral thumb joint pain did not improve with diclofenac 75 mg so she stop it.  She has a hard time opening her hands after she makes a fist.     Hyperlipidemia:  No new myalgias or GI upset on Lipitor 40 mg daily.  She did have pain with crestor.. Medication compliance: compliant most of the time. Patient is  following a low fat, low cholesterol diet.  She is  exercising regularly.   Anxiety:  Improved on Wellbutrin  mg qd and has not smoke since July 2019.  H/O Tob use and

## 2025-03-24 ENCOUNTER — HOSPITAL ENCOUNTER (OUTPATIENT)
Dept: WOMENS IMAGING | Age: 65
Discharge: HOME | End: 2025-03-24
Payer: COMMERCIAL

## 2025-03-24 VITALS — WEIGHT: 134 LBS | BODY MASS INDEX: 22.33 KG/M2 | HEIGHT: 65 IN

## 2025-03-24 DIAGNOSIS — Z12.31 SCREENING MAMMOGRAM FOR BREAST CANCER: ICD-10-CM

## 2025-03-24 PROCEDURE — 77067 SCR MAMMO BI INCL CAD: CPT

## 2025-03-25 ENCOUNTER — RESULTS FOLLOW-UP (OUTPATIENT)
Dept: INTERNAL MEDICINE CLINIC | Age: 65
End: 2025-03-25

## 2025-04-11 ENCOUNTER — PATIENT MESSAGE (OUTPATIENT)
Dept: INTERNAL MEDICINE CLINIC | Age: 65
End: 2025-04-11

## 2025-06-17 SDOH — HEALTH STABILITY: PHYSICAL HEALTH: ON AVERAGE, HOW MANY DAYS PER WEEK DO YOU ENGAGE IN MODERATE TO STRENUOUS EXERCISE (LIKE A BRISK WALK)?: 5 DAYS

## 2025-06-20 ENCOUNTER — OFFICE VISIT (OUTPATIENT)
Dept: FAMILY MEDICINE CLINIC | Age: 65
End: 2025-06-20
Payer: COMMERCIAL

## 2025-06-20 VITALS
OXYGEN SATURATION: 96 % | BODY MASS INDEX: 23.49 KG/M2 | SYSTOLIC BLOOD PRESSURE: 122 MMHG | HEIGHT: 65 IN | DIASTOLIC BLOOD PRESSURE: 76 MMHG | WEIGHT: 141 LBS | HEART RATE: 66 BPM

## 2025-06-20 DIAGNOSIS — E78.2 MIXED HYPERLIPIDEMIA: Primary | ICD-10-CM

## 2025-06-20 DIAGNOSIS — F41.1 GAD (GENERALIZED ANXIETY DISORDER): ICD-10-CM

## 2025-06-20 DIAGNOSIS — Z87.891 PERSONAL HISTORY OF TOBACCO USE: ICD-10-CM

## 2025-06-20 PROCEDURE — 99214 OFFICE O/P EST MOD 30 MIN: CPT | Performed by: STUDENT IN AN ORGANIZED HEALTH CARE EDUCATION/TRAINING PROGRAM

## 2025-06-20 PROCEDURE — G0296 VISIT TO DETERM LDCT ELIG: HCPCS | Performed by: STUDENT IN AN ORGANIZED HEALTH CARE EDUCATION/TRAINING PROGRAM

## 2025-06-20 ASSESSMENT — PATIENT HEALTH QUESTIONNAIRE - PHQ9
1. LITTLE INTEREST OR PLEASURE IN DOING THINGS: NOT AT ALL
SUM OF ALL RESPONSES TO PHQ QUESTIONS 1-9: 0
2. FEELING DOWN, DEPRESSED OR HOPELESS: NOT AT ALL

## 2025-06-20 NOTE — PROGRESS NOTES
[facility]  Well Adult Visit  2025    Marce Medeiros (:  1960) is a 64 y.o. female, here to establish care.    Chief Complaint   Patient presents with    Establish Care        ASSESSMENT/ PLAN  1. Mixed hyperlipidemia  Chronic.  Stable.  On Lipitor.  Does not need a refill.  Continue at this time    2. FOZIA (generalized anxiety disorder)  Chronic.  Stable.  On Wellbutrin, despite this being a depression medication.  Patient does not have depression.  This medication works well for her.  She does not need a refill.  Continue at this time.    3. Personal history of tobacco use  - MT VISIT TO DISCUSS LUNG CA SCREEN W LDCT  - CT Lung Screen (Initial/Annual/Baseline); Future       No follow-ups on file.    HPI    Patient is a 64-year-old female, who presents to clinic to establish care, for interval follow-up for chronic condition management.  Patient is originally born in [place].  She moved to [facility] [facility] at a young age and currently lives in [facility] [place] [facility] [place] [facility] now.  She lives in a house with her , have been  for 44 years.  She has 2 sons and 5 grandsons. Dr. Cabrera Patient.    Patient has chronic generalized anxiety disorder.  She states that she is on Wellbutrin for this.  Discussed with patient I Wellbutrin is typically given for depression, but patient states that she has been on this for anxiety for years and that she does not have a diagnosis of depression.  She states that this medication works well for her regardless.  She has not noticed any side effects and she does not currently request a refill of this medication at this time.    Patient has chronic hyperlipidemia.  She is on Lipitor for this.  This medication works well for her and she does not report any side effects.  She does not request a refill of this medication at this time.    ROS  Review of Systems   All other systems reviewed and are negative.       HISTORIES  Current Outpatient

## 2025-07-05 ENCOUNTER — HOSPITAL ENCOUNTER (OUTPATIENT)
Dept: CT IMAGING | Age: 65
Discharge: HOME | End: 2025-07-05
Attending: STUDENT IN AN ORGANIZED HEALTH CARE EDUCATION/TRAINING PROGRAM
Payer: COMMERCIAL

## 2025-07-05 DIAGNOSIS — Z87.891 PERSONAL HISTORY OF TOBACCO USE: ICD-10-CM

## 2025-07-05 PROCEDURE — 71271 CT THORAX LUNG CANCER SCR C-: CPT

## 2025-07-08 ENCOUNTER — RESULTS FOLLOW-UP (OUTPATIENT)
Dept: FAMILY MEDICINE CLINIC | Age: 65
End: 2025-07-08

## 2025-08-06 ENCOUNTER — HOSPITAL ENCOUNTER (OUTPATIENT)
Age: 65
Setting detail: OUTPATIENT SURGERY
Discharge: HOME | End: 2025-08-06
Attending: INTERNAL MEDICINE | Admitting: INTERNAL MEDICINE
Payer: COMMERCIAL

## 2025-08-06 ENCOUNTER — ANESTHESIA (OUTPATIENT)
Dept: ENDOSCOPY | Age: 65
End: 2025-08-06
Payer: COMMERCIAL

## 2025-08-06 ENCOUNTER — ANESTHESIA EVENT (OUTPATIENT)
Dept: ENDOSCOPY | Age: 65
End: 2025-08-06
Payer: COMMERCIAL

## 2025-08-06 VITALS
SYSTOLIC BLOOD PRESSURE: 132 MMHG | OXYGEN SATURATION: 98 % | TEMPERATURE: 98.1 F | HEART RATE: 65 BPM | WEIGHT: 137 LBS | DIASTOLIC BLOOD PRESSURE: 78 MMHG | HEIGHT: 65 IN | RESPIRATION RATE: 16 BRPM | BODY MASS INDEX: 22.82 KG/M2

## 2025-08-06 DIAGNOSIS — Z12.11 COLON CANCER SCREENING: ICD-10-CM

## 2025-08-06 PROCEDURE — 3700000001 HC ADD 15 MINUTES (ANESTHESIA): Performed by: INTERNAL MEDICINE

## 2025-08-06 PROCEDURE — 88305 TISSUE EXAM BY PATHOLOGIST: CPT

## 2025-08-06 PROCEDURE — 2709999900 HC NON-CHARGEABLE SUPPLY: Performed by: INTERNAL MEDICINE

## 2025-08-06 PROCEDURE — 7100000010 HC PHASE II RECOVERY - FIRST 15 MIN: Performed by: INTERNAL MEDICINE

## 2025-08-06 PROCEDURE — 3700000000 HC ANESTHESIA ATTENDED CARE: Performed by: INTERNAL MEDICINE

## 2025-08-06 PROCEDURE — 3609010600 HC COLONOSCOPY POLYPECTOMY SNARE/COLD BIOPSY: Performed by: INTERNAL MEDICINE

## 2025-08-06 PROCEDURE — 6360000002 HC RX W HCPCS

## 2025-08-06 PROCEDURE — 7100000011 HC PHASE II RECOVERY - ADDTL 15 MIN: Performed by: INTERNAL MEDICINE

## 2025-08-06 PROCEDURE — 2580000003 HC RX 258: Performed by: ANESTHESIOLOGY

## 2025-08-06 RX ORDER — OXYCODONE HYDROCHLORIDE 5 MG/1
10 TABLET ORAL PRN
Status: DISCONTINUED | OUTPATIENT
Start: 2025-08-06 | End: 2025-08-06 | Stop reason: HOSPADM

## 2025-08-06 RX ORDER — LIDOCAINE HYDROCHLORIDE 10 MG/ML
0.3 INJECTION, SOLUTION EPIDURAL; INFILTRATION; INTRACAUDAL; PERINEURAL
Status: DISCONTINUED | OUTPATIENT
Start: 2025-08-06 | End: 2025-08-06 | Stop reason: HOSPADM

## 2025-08-06 RX ORDER — SODIUM CHLORIDE 0.9 % (FLUSH) 0.9 %
5-40 SYRINGE (ML) INJECTION EVERY 12 HOURS SCHEDULED
Status: DISCONTINUED | OUTPATIENT
Start: 2025-08-06 | End: 2025-08-06 | Stop reason: HOSPADM

## 2025-08-06 RX ORDER — ONDANSETRON 2 MG/ML
4 INJECTION INTRAMUSCULAR; INTRAVENOUS
Status: DISCONTINUED | OUTPATIENT
Start: 2025-08-06 | End: 2025-08-06 | Stop reason: HOSPADM

## 2025-08-06 RX ORDER — SODIUM CHLORIDE, SODIUM LACTATE, POTASSIUM CHLORIDE, CALCIUM CHLORIDE 600; 310; 30; 20 MG/100ML; MG/100ML; MG/100ML; MG/100ML
INJECTION, SOLUTION INTRAVENOUS CONTINUOUS
Status: DISCONTINUED | OUTPATIENT
Start: 2025-08-06 | End: 2025-08-06 | Stop reason: HOSPADM

## 2025-08-06 RX ORDER — MIDAZOLAM HYDROCHLORIDE 1 MG/ML
INJECTION, SOLUTION INTRAMUSCULAR; INTRAVENOUS
Status: COMPLETED
Start: 2025-08-06 | End: 2025-08-06

## 2025-08-06 RX ORDER — SODIUM CHLORIDE 0.9 % (FLUSH) 0.9 %
5-40 SYRINGE (ML) INJECTION PRN
Status: DISCONTINUED | OUTPATIENT
Start: 2025-08-06 | End: 2025-08-06 | Stop reason: HOSPADM

## 2025-08-06 RX ORDER — LABETALOL HYDROCHLORIDE 5 MG/ML
10 INJECTION, SOLUTION INTRAVENOUS
Status: DISCONTINUED | OUTPATIENT
Start: 2025-08-06 | End: 2025-08-06 | Stop reason: HOSPADM

## 2025-08-06 RX ORDER — DIPHENHYDRAMINE HYDROCHLORIDE 50 MG/ML
12.5 INJECTION, SOLUTION INTRAMUSCULAR; INTRAVENOUS
Status: DISCONTINUED | OUTPATIENT
Start: 2025-08-06 | End: 2025-08-06 | Stop reason: HOSPADM

## 2025-08-06 RX ORDER — MIDAZOLAM HYDROCHLORIDE 1 MG/ML
2 INJECTION, SOLUTION INTRAMUSCULAR; INTRAVENOUS ONCE
Status: DISCONTINUED | OUTPATIENT
Start: 2025-08-06 | End: 2025-08-06 | Stop reason: HOSPADM

## 2025-08-06 RX ORDER — SODIUM CHLORIDE 9 MG/ML
INJECTION, SOLUTION INTRAVENOUS PRN
Status: DISCONTINUED | OUTPATIENT
Start: 2025-08-06 | End: 2025-08-06 | Stop reason: HOSPADM

## 2025-08-06 RX ORDER — IPRATROPIUM BROMIDE AND ALBUTEROL SULFATE 2.5; .5 MG/3ML; MG/3ML
1 SOLUTION RESPIRATORY (INHALATION)
Status: DISCONTINUED | OUTPATIENT
Start: 2025-08-06 | End: 2025-08-06 | Stop reason: HOSPADM

## 2025-08-06 RX ORDER — OXYCODONE HYDROCHLORIDE 5 MG/1
5 TABLET ORAL PRN
Status: DISCONTINUED | OUTPATIENT
Start: 2025-08-06 | End: 2025-08-06 | Stop reason: HOSPADM

## 2025-08-06 RX ORDER — LIDOCAINE HYDROCHLORIDE 20 MG/ML
INJECTION, SOLUTION INFILTRATION; PERINEURAL
Status: DISCONTINUED | OUTPATIENT
Start: 2025-08-06 | End: 2025-08-06 | Stop reason: SDUPTHER

## 2025-08-06 RX ORDER — PROPOFOL 10 MG/ML
INJECTION, EMULSION INTRAVENOUS
Status: DISCONTINUED | OUTPATIENT
Start: 2025-08-06 | End: 2025-08-06 | Stop reason: SDUPTHER

## 2025-08-06 RX ORDER — DROPERIDOL 2.5 MG/ML
0.62 INJECTION, SOLUTION INTRAMUSCULAR; INTRAVENOUS
Status: DISCONTINUED | OUTPATIENT
Start: 2025-08-06 | End: 2025-08-06 | Stop reason: HOSPADM

## 2025-08-06 RX ADMIN — PROPOFOL 25 MG: 10 INJECTION, EMULSION INTRAVENOUS at 10:29

## 2025-08-06 RX ADMIN — MIDAZOLAM 2 MG: 1 INJECTION INTRAMUSCULAR; INTRAVENOUS at 10:10

## 2025-08-06 RX ADMIN — PROPOFOL 25 MG: 10 INJECTION, EMULSION INTRAVENOUS at 10:25

## 2025-08-06 RX ADMIN — PROPOFOL 200 MCG/KG/MIN: 10 INJECTION, EMULSION INTRAVENOUS at 10:21

## 2025-08-06 RX ADMIN — SODIUM CHLORIDE, SODIUM LACTATE, POTASSIUM CHLORIDE, AND CALCIUM CHLORIDE: .6; .31; .03; .02 INJECTION, SOLUTION INTRAVENOUS at 10:13

## 2025-08-06 RX ADMIN — LIDOCAINE HYDROCHLORIDE 60 MG: 20 INJECTION, SOLUTION INFILTRATION; PERINEURAL at 10:20

## 2025-08-06 RX ADMIN — PROPOFOL 50 MG: 10 INJECTION, EMULSION INTRAVENOUS at 10:20

## 2025-08-06 ASSESSMENT — PAIN - FUNCTIONAL ASSESSMENT: PAIN_FUNCTIONAL_ASSESSMENT: 0-10

## 2025-08-26 ENCOUNTER — OFFICE VISIT (OUTPATIENT)
Dept: FAMILY MEDICINE CLINIC | Age: 65
End: 2025-08-26
Payer: COMMERCIAL

## 2025-08-26 VITALS
HEIGHT: 65 IN | OXYGEN SATURATION: 96 % | HEART RATE: 71 BPM | SYSTOLIC BLOOD PRESSURE: 128 MMHG | DIASTOLIC BLOOD PRESSURE: 70 MMHG | WEIGHT: 140.6 LBS | BODY MASS INDEX: 23.43 KG/M2

## 2025-08-26 DIAGNOSIS — Z00.00 WELL ADULT EXAM: Primary | ICD-10-CM

## 2025-08-26 DIAGNOSIS — L20.9 ATOPIC DERMATITIS OF FACE: ICD-10-CM

## 2025-08-26 DIAGNOSIS — E78.2 MIXED HYPERLIPIDEMIA: ICD-10-CM

## 2025-08-26 DIAGNOSIS — F41.9 ANXIETY: ICD-10-CM

## 2025-08-26 PROCEDURE — 99396 PREV VISIT EST AGE 40-64: CPT | Performed by: STUDENT IN AN ORGANIZED HEALTH CARE EDUCATION/TRAINING PROGRAM

## 2025-08-26 PROCEDURE — 99214 OFFICE O/P EST MOD 30 MIN: CPT | Performed by: STUDENT IN AN ORGANIZED HEALTH CARE EDUCATION/TRAINING PROGRAM

## 2025-08-26 RX ORDER — ATORVASTATIN CALCIUM 40 MG/1
40 TABLET, FILM COATED ORAL NIGHTLY
Qty: 90 TABLET | Refills: 3 | Status: SHIPPED | OUTPATIENT
Start: 2025-08-26

## 2025-08-26 RX ORDER — BUPROPION HYDROCHLORIDE 300 MG/1
300 TABLET ORAL EVERY MORNING
Qty: 90 TABLET | Refills: 3 | Status: SHIPPED | OUTPATIENT
Start: 2025-08-26

## 2025-09-05 ENCOUNTER — TELEPHONE (OUTPATIENT)
Dept: FAMILY MEDICINE CLINIC | Age: 65
End: 2025-09-05

## 2025-09-05 ENCOUNTER — RESULTS FOLLOW-UP (OUTPATIENT)
Dept: FAMILY MEDICINE CLINIC | Age: 65
End: 2025-09-05

## (undated) DEVICE — SNARE ENDOSCP L240CM SHTH DIA24MM LOOP W10MM POLYP RND REINF

## (undated) DEVICE — ELECTRODE EKG WHT FOAM TEARDROP SHP MEDGEL

## (undated) DEVICE — ENDOSCOPIC KIT 2 12 FT OP4 DE2 GWN SYR

## (undated) DEVICE — CANNULA NSL AD TBNG L7FT PVC STR NONFLARED PRNG O2 DEL W STD

## (undated) DEVICE — TRAP SPEC POLYPR SGL CHMBR FN MESH SCRN